# Patient Record
Sex: MALE | Race: WHITE | NOT HISPANIC OR LATINO | Employment: UNEMPLOYED | URBAN - METROPOLITAN AREA
[De-identification: names, ages, dates, MRNs, and addresses within clinical notes are randomized per-mention and may not be internally consistent; named-entity substitution may affect disease eponyms.]

---

## 2019-01-21 ENCOUNTER — HOSPITAL ENCOUNTER (EMERGENCY)
Facility: HOSPITAL | Age: 34
Discharge: HOME | End: 2019-01-21
Attending: EMERGENCY MEDICINE
Payer: MEDICAID

## 2019-01-21 VITALS
OXYGEN SATURATION: 98 % | BODY MASS INDEX: 30.55 KG/M2 | DIASTOLIC BLOOD PRESSURE: 102 MMHG | TEMPERATURE: 99 F | HEART RATE: 114 BPM | RESPIRATION RATE: 18 BRPM | SYSTOLIC BLOOD PRESSURE: 163 MMHG | WEIGHT: 264 LBS | HEIGHT: 78 IN

## 2019-01-21 DIAGNOSIS — M10.9 ACUTE GOUT OF LEFT FOOT, UNSPECIFIED CAUSE: Primary | ICD-10-CM

## 2019-01-21 DIAGNOSIS — M10.9 ACUTE GOUT OF RIGHT FOOT, UNSPECIFIED CAUSE: ICD-10-CM

## 2019-01-21 PROCEDURE — 25000003 PHARM REV CODE 250: Mod: ER | Performed by: EMERGENCY MEDICINE

## 2019-01-21 PROCEDURE — 99283 EMERGENCY DEPT VISIT LOW MDM: CPT | Mod: ER

## 2019-01-21 RX ORDER — COLCHICINE 0.6 MG/1
0.6 TABLET ORAL DAILY
Qty: 30 TABLET | Refills: 11 | Status: SHIPPED | OUTPATIENT
Start: 2019-01-21 | End: 2019-11-28 | Stop reason: CLARIF

## 2019-01-21 RX ORDER — INDOMETHACIN 25 MG/1
50 CAPSULE ORAL
Qty: 30 CAPSULE | Refills: 0 | Status: SHIPPED | OUTPATIENT
Start: 2019-01-21 | End: 2019-11-28 | Stop reason: CLARIF

## 2019-01-21 RX ORDER — COLCHICINE 0.6 MG/1
0.6 TABLET, FILM COATED ORAL
Status: COMPLETED | OUTPATIENT
Start: 2019-01-21 | End: 2019-01-21

## 2019-01-21 RX ADMIN — COLCHICINE 0.6 MG: 0.6 TABLET, FILM COATED ORAL at 08:01

## 2019-01-21 NOTE — ED PROVIDER NOTES
Encounter Date: 1/21/2019    SCRIBE #1 NOTE: I, Yessica Hinkle, am scribing for, and in the presence of,  Dr. So. I have scribed the following portions of the note - Other sections scribed: HPI,ROS,PE .       History     Chief Complaint   Patient presents with    Gout     lulú. feet are hurting  think's his gout is acting up,  he denies injuries  started  Friday.  worse this morning.      32 y/o/m with a hx of gout presents with bilateral foot pain for 3 days. Pain worsened this morning The patient thinks this pain is his gout. He states normally it is always his left foot. He states he did drink ETOH this past weekend. Last gout flare up was 1 month a half ago. Denies fever, weakness or numbness.  Patient has not taken anything for the pain.  Usually takes colchicine with improvement      The history is provided by the patient. No  was used.     Review of patient's allergies indicates:  Allergies not on file  Past Medical History:   Diagnosis Date    Gout      History reviewed. No pertinent surgical history.  History reviewed. No pertinent family history.  Social History     Tobacco Use    Smoking status: Former Smoker    Tobacco comment: has not smoked in  8 years.    Substance Use Topics    Alcohol use: Yes     Comment: weekends     Drug use: Yes     Comment: a long time ago.   not now.      Review of Systems   Constitutional: Negative for fever.   Musculoskeletal: Negative for gait problem.        Bilateral foot pain.   Skin: Negative for color change.   Neurological: Negative for weakness and numbness.       Physical Exam     Initial Vitals [01/21/19 0819]   BP Pulse Resp Temp SpO2   (!) 168/101 108 18 98.8 °F (37.1 °C) 100 %      MAP       --         Physical Exam    Nursing note and vitals reviewed.  Constitutional: He appears well-developed and well-nourished.   HENT:   Head: Normocephalic and atraumatic.   Eyes: Conjunctivae are normal.   Neck: Normal range of motion. Neck  supple.   Cardiovascular: Normal rate, intact distal pulses and normal pulses.   Pulmonary/Chest: No respiratory distress.   Musculoskeletal: Normal range of motion.        Right foot: There is tenderness.        Left foot: There is tenderness.   Minimal tenderness, lateral aspect of left foot. Right foot minimal tenderness , slightly with erythema. No warmth. Good pulse and sensation bilaterally.     Neurological: He is alert and oriented to person, place, and time. No sensory deficit.   Skin: Skin is warm and dry.   Psychiatric: He has a normal mood and affect. His behavior is normal.         ED Course   Procedures  Labs Reviewed - No data to display       Imaging Results    None          Medical Decision Making:   Initial Assessment:   34 y/o/m presents with bilateral foot pain for 3 days. Hx of gout. Exam significant for Minimal tenderness, lateral aspect of left foot. Right foot minimal tenderness , slightly with erythema. No warmth. Good pulse and sensation bilaterally.   ED Management:  Ordering colchicine.  Patient will be discharged on colchicine and indomethacin            Scribe Attestation:   Scribe #1: I performed the above scribed service and the documentation accurately describes the services I performed. I attest to the accuracy of the note.       I, Dr. Yuliet So, personally performed the services described in this documentation. All medical record entries made by the scribe were at my direction and in my presence.  I have reviewed the chart and agree that the record reflects my personal performance and is accurate and complete. Yuliet So MD.  12:31 PM 01/21/2019             Clinical Impression:     1. Acute gout of left foot, unspecified cause    2. Acute gout of right foot, unspecified cause                                   Yuliet So MD  01/21/19 4545

## 2019-04-30 ENCOUNTER — HOSPITAL ENCOUNTER (EMERGENCY)
Facility: HOSPITAL | Age: 34
Discharge: HOME | End: 2019-04-30
Attending: EMERGENCY MEDICINE
Payer: MEDICAID

## 2019-04-30 DIAGNOSIS — R06.02 SHORTNESS OF BREATH: ICD-10-CM

## 2019-04-30 DIAGNOSIS — R50.9 FEVER: ICD-10-CM

## 2019-04-30 DIAGNOSIS — B34.9 VIRAL SYNDROME: Primary | ICD-10-CM

## 2019-04-30 LAB
ALBUMIN SERPL-MCNC: 3.7 G/DL
ALP SERPL-CCNC: 63 U/L
BASOPHILS # BLD AUTO: 0.01 K/UL (ref 0–0.2)
BASOPHILS NFR BLD: 0.1 % (ref 0–1.9)
BILIRUB SERPL-MCNC: 0.6 MG/DL
BILIRUBIN, POC UA: NEGATIVE
BLOOD, POC UA: NEGATIVE
BUN SERPL-MCNC: 18 MG/DL
CALCIUM SERPL-MCNC: 9.4 MG/DL
CHLORIDE SERPL-SCNC: 106 MMOL/L
CLARITY, POC UA: CLEAR
COLOR, POC UA: YELLOW
CREAT SERPL-MCNC: 1.3 MG/DL
CTP QC/QA: YES
DIFFERENTIAL METHOD: ABNORMAL
EOSINOPHIL # BLD AUTO: 0.1 K/UL (ref 0–0.5)
EOSINOPHIL NFR BLD: 0.4 % (ref 0–8)
ERYTHROCYTE [DISTWIDTH] IN BLOOD BY AUTOMATED COUNT: 12.9 % (ref 11.5–14.5)
FLUAV AG NPH QL: NEGATIVE
FLUBV AG NPH QL: NEGATIVE
GLUCOSE SERPL-MCNC: 127 MG/DL (ref 70–110)
GLUCOSE, POC UA: NEGATIVE
HCT VFR BLD AUTO: 45.6 % (ref 40–54)
HGB BLD-MCNC: 15.2 G/DL (ref 14–18)
KETONES, POC UA: NEGATIVE
LEUKOCYTE EST, POC UA: ABNORMAL
LYMPHOCYTES # BLD AUTO: 0.5 K/UL (ref 1–4.8)
LYMPHOCYTES NFR BLD: 3.4 % (ref 18–48)
MCH RBC QN AUTO: 30 PG (ref 27–31)
MCHC RBC AUTO-ENTMCNC: 33.3 G/DL (ref 32–36)
MCV RBC AUTO: 90 FL (ref 82–98)
MONOCYTES # BLD AUTO: 1.3 K/UL (ref 0.3–1)
MONOCYTES NFR BLD: 9 % (ref 4–15)
NEUTROPHILS # BLD AUTO: 12.3 K/UL (ref 1.8–7.7)
NEUTROPHILS NFR BLD: 87.1 % (ref 38–73)
NITRITE, POC UA: NEGATIVE
PH UR STRIP: 5.5 [PH]
PLATELET # BLD AUTO: 180 K/UL (ref 150–350)
PMV BLD AUTO: 11.8 FL (ref 9.2–12.9)
POC ALT (SGPT): 105 U/L
POC AST (SGOT): 57 U/L
POC CARDIAC TROPONIN I: 0 NG/ML
POC D-DI: <100 NG/ML (ref 0–450)
POC TCO2: 23 MMOL/L
POTASSIUM BLD-SCNC: 4.4 MMOL/L
PROTEIN, POC UA: NEGATIVE
PROTEIN, POC: 7.3 G/DL
RBC # BLD AUTO: 5.07 M/UL (ref 4.6–6.2)
SAMPLE: NORMAL
SODIUM BLD-SCNC: 142 MMOL/L
SPECIFIC GRAVITY, POC UA: 1.02
UROBILINOGEN, POC UA: 0.2 E.U./DL
WBC # BLD AUTO: 14.06 K/UL (ref 3.9–12.7)

## 2019-04-30 PROCEDURE — 25000003 PHARM REV CODE 250: Mod: ER | Performed by: EMERGENCY MEDICINE

## 2019-04-30 PROCEDURE — 85025 COMPLETE CBC W/AUTO DIFF WBC: CPT

## 2019-04-30 PROCEDURE — 87086 URINE CULTURE/COLONY COUNT: CPT

## 2019-04-30 PROCEDURE — 93005 ELECTROCARDIOGRAM TRACING: CPT | Mod: ER

## 2019-04-30 PROCEDURE — 81003 URINALYSIS AUTO W/O SCOPE: CPT | Mod: ER

## 2019-04-30 PROCEDURE — 96365 THER/PROPH/DIAG IV INF INIT: CPT | Mod: ER

## 2019-04-30 PROCEDURE — 93010 EKG 12-LEAD: ICD-10-PCS | Mod: ,,, | Performed by: INTERNAL MEDICINE

## 2019-04-30 PROCEDURE — 87804 INFLUENZA ASSAY W/OPTIC: CPT | Mod: ER

## 2019-04-30 PROCEDURE — 85379 FIBRIN DEGRADATION QUANT: CPT | Mod: ER

## 2019-04-30 PROCEDURE — 84484 ASSAY OF TROPONIN QUANT: CPT | Mod: ER

## 2019-04-30 PROCEDURE — 63600175 PHARM REV CODE 636 W HCPCS: Mod: ER | Performed by: EMERGENCY MEDICINE

## 2019-04-30 PROCEDURE — 96361 HYDRATE IV INFUSION ADD-ON: CPT | Mod: ER

## 2019-04-30 PROCEDURE — 85025 COMPLETE CBC W/AUTO DIFF WBC: CPT | Mod: ER

## 2019-04-30 PROCEDURE — 80053 COMPREHEN METABOLIC PANEL: CPT | Mod: ER

## 2019-04-30 PROCEDURE — 99285 EMERGENCY DEPT VISIT HI MDM: CPT | Mod: 25,ER

## 2019-04-30 PROCEDURE — 96375 TX/PRO/DX INJ NEW DRUG ADDON: CPT | Mod: ER

## 2019-04-30 PROCEDURE — 93010 ELECTROCARDIOGRAM REPORT: CPT | Mod: ,,, | Performed by: INTERNAL MEDICINE

## 2019-04-30 RX ORDER — IBUPROFEN 400 MG/1
800 TABLET ORAL
Status: DISCONTINUED | OUTPATIENT
Start: 2019-04-30 | End: 2019-04-30

## 2019-04-30 RX ORDER — ACETAMINOPHEN 500 MG
1000 TABLET ORAL
Status: COMPLETED | OUTPATIENT
Start: 2019-04-30 | End: 2019-04-30

## 2019-04-30 RX ORDER — KETOROLAC TROMETHAMINE 30 MG/ML
30 INJECTION, SOLUTION INTRAMUSCULAR; INTRAVENOUS
Status: COMPLETED | OUTPATIENT
Start: 2019-04-30 | End: 2019-04-30

## 2019-04-30 RX ORDER — IBUPROFEN 400 MG/1
800 TABLET ORAL
Status: COMPLETED | OUTPATIENT
Start: 2019-04-30 | End: 2019-04-30

## 2019-04-30 RX ADMIN — IBUPROFEN 800 MG: 400 TABLET, FILM COATED ORAL at 06:04

## 2019-04-30 RX ADMIN — ACETAMINOPHEN 1000 MG: 500 TABLET, FILM COATED ORAL at 04:04

## 2019-04-30 RX ADMIN — SODIUM CHLORIDE 1000 ML: 0.9 INJECTION, SOLUTION INTRAVENOUS at 07:04

## 2019-04-30 RX ADMIN — SODIUM CHLORIDE 1000 ML: 0.9 INJECTION, SOLUTION INTRAVENOUS at 06:04

## 2019-04-30 RX ADMIN — KETOROLAC TROMETHAMINE 30 MG: 30 INJECTION, SOLUTION INTRAMUSCULAR at 04:04

## 2019-04-30 RX ADMIN — SODIUM CHLORIDE 1000 ML: 0.9 INJECTION, SOLUTION INTRAVENOUS at 04:04

## 2019-04-30 RX ADMIN — SODIUM CHLORIDE 1000 ML: 0.9 INJECTION, SOLUTION INTRAVENOUS at 05:04

## 2019-04-30 RX ADMIN — CEFTRIAXONE 1 G: 1 INJECTION, SOLUTION INTRAVENOUS at 06:04

## 2019-04-30 NOTE — ED PROVIDER NOTES
"Encounter Date: 4/30/2019    SCRIBE #1 NOTE: I, Yessica Hinkle, am scribing for, and in the presence of,  Dr. So . I have scribed the following portions of the note - Other sections scribed: HPI, ROS,PE .       History     Chief Complaint   Patient presents with    Generalized Body Aches     pt reports he went to [employer] for lunch at noon and when he returned to work he started feeling bad.  headache, achy, pain that shoots down left leg. "and it feels like its hard to catch my breath when i'm talking"    Chills    Headache    Shortness of Breath     35 y/o/m presents with dizziness, headache, lightheadedness, cramping to left leg, nausea, SOB with speaking, diarrhea, felt hot, chills, blurry vision after eating at [employer] today at 12 pm. He states he does not drink enough water.  Denies vomiting, cough, hematuria, dysuria or neck pain. Patient's symptoms began suddenly after lunch.  He denies recent illness    The history is provided by the patient. No  was used.     Review of patient's allergies indicates:  No Known Allergies  Past Medical History:   Diagnosis Date    Gout      History reviewed. No pertinent surgical history.  History reviewed. No pertinent family history.  Social History     Tobacco Use    Smoking status: Former Smoker    Tobacco comment: has not smoked in  8 years.    Substance Use Topics    Alcohol use: Yes     Comment: weekends     Drug use: Yes     Comment: a long time ago.   not now.      Review of Systems   Constitutional: Positive for chills.   Eyes: Positive for visual disturbance.   Respiratory: Positive for shortness of breath (with speaking). Negative for cough.    Gastrointestinal: Positive for diarrhea and nausea. Negative for vomiting.   Genitourinary: Negative for difficulty urinating, dysuria and hematuria.   Musculoskeletal: Positive for myalgias (cramping to left leg). Negative for neck pain.   Neurological: Positive for dizziness, " light-headedness and headaches.   All other systems reviewed and are negative.      Physical Exam     Initial Vitals [04/30/19 1634]   BP Pulse Resp Temp SpO2   (!) 146/81 (!) 127 20 (!) 101.9 °F (38.8 °C) 97 %      MAP       --         Physical Exam    Nursing note and vitals reviewed.  Constitutional: He appears well-developed and well-nourished.   HENT:   Head: Normocephalic and atraumatic.   Mouth/Throat: Oropharynx is clear and moist. No oropharyngeal exudate, posterior oropharyngeal edema or posterior oropharyngeal erythema.   Eyes: Conjunctivae are normal.   Neck: Normal range of motion. Neck supple. No neck rigidity.   No nuclear rigidity.    Cardiovascular: Normal rate, regular rhythm, normal heart sounds and intact distal pulses. Exam reveals no gallop and no friction rub.    No murmur heard.  Pulmonary/Chest: Breath sounds normal. No respiratory distress. He has no wheezes. He has no rhonchi. He has no rales.   Abdominal: There is no tenderness. There is no rigidity, no rebound and no guarding.   Musculoskeletal: Normal range of motion.   Neurological: He is alert and oriented to person, place, and time. No cranial nerve deficit or sensory deficit. GCS eye subscore is 4. GCS verbal subscore is 5. GCS motor subscore is 6.   No photophobia.    Skin: Skin is warm and dry.   Psychiatric: He has a normal mood and affect. His behavior is normal.         ED Course   Critical Care  Date/Time: 4/30/2019 6:37 PM  Performed by: Yuliet So MD  Authorized by: Yuliet So MD   Direct patient critical care time: 30 minutes  Ordering / reviewing critical care time: 5 minutes  Documentation critical care time: 5 minutes  Total critical care time (exclusive of procedural time) : 40 minutes  Critical care was necessary to treat or prevent imminent or life-threatening deterioration of the following conditions: dehydration.  Critical care was time spent personally by me on the following activities: development  of treatment plan with patient or surrogate, obtaining history from patient or surrogate, ordering and review of radiographic studies, re-evaluation of patient's condition, ordering and review of laboratory studies, examination of patient, evaluation of patient's response to treatment and ordering and performing treatments and interventions.        Labs Reviewed   CBC W/ AUTO DIFFERENTIAL - Abnormal; Notable for the following components:       Result Value    WBC 14.06 (*)     Gran # (ANC) 12.3 (*)     Lymph # 0.5 (*)     Mono # 1.3 (*)     Gran% 87.1 (*)     Lymph% 3.4 (*)     All other components within normal limits   POCT URINALYSIS W/O SCOPE - Abnormal; Notable for the following components:    Glucose, UA Negative (*)     Bilirubin, UA Negative (*)     Ketones, UA Negative (*)     Blood, UA Negative (*)     Protein, UA Negative (*)     Nitrite, UA Negative (*)     Leukocytes, UA Trace (*)     All other components within normal limits   POCT D DIMER - Abnormal; Notable for the following components:    POC D-DI <100 (*)     All other components within normal limits   CULTURE, URINE   TROPONIN ISTAT   POCT URINALYSIS W/O SCOPE   POCT INFLUENZA A/B   POCT CBC   POCT CMP   POCT CMP   POCT D DIMER   POCT TROPONIN     EKG Readings: (Independently Interpreted)   Sinus tachycardia, heart rate 142, no ST segment elevation, normal QT, normal axis       Imaging Results          X-Ray Chest PA And Lateral (Final result)  Result time 04/30/19 17:09:57    Final result by Bobby Lozano MD (04/30/19 17:09:57)                 Impression:      1. Mildly prominent interstitial attenuation, likely accentuated by shallow inspiratory effort, no convincing large focal consolidation.      Electronically signed by: Bobby Lozano MD  Date:    04/30/2019  Time:    17:09             Narrative:    EXAMINATION:  XR CHEST PA AND LATERAL    CLINICAL HISTORY:  Fever, unspecified    TECHNIQUE:  PA and lateral views of the chest were  performed.    COMPARISON:  None    FINDINGS:  The cardiomediastinal silhouette is not enlarged.  There is no pleural effusion.  The trachea is midline.  The lungs are symmetrically expanded bilaterally with coarse interstitial attenuation bilaterally.  There is elevation of the right hemidiaphragm..  No large focal consolidation seen.  There is no pneumothorax.  The osseous structures are remarkable for degenerative changes..                                 Medical Decision Making:   Initial Assessment:   33 y/o/m presents with lightheadedness, chills, SOB, diarrhea and nausea today after eating at [employer]. Exam benign. No nuchal  rigidity.  Patient is tachycardic.  He is nontoxic appearing  Independently Interpreted Test(s):   I have ordered and independently interpreted EKG Reading(s) - see prior notes  Clinical Tests:   Lab Tests: Ordered and Reviewed  Radiological Study: Ordered and Reviewed  Medical Tests: Ordered and Reviewed  ED Management:  Treating with IV fluids, toradol and tylenol. CXR, flu swab, and EKG ordered.  Patient's workup is significant for a white blood cell count of 14.8, he has trace leukocytes %period% creatinine is 1.3, glucose 127.  Patient has no nuchal rigidity or photophobia.  I do not suspect meningitis.  Chest x-ray shows no acute abnormalities.  Flu was negative. Patient be given IV fluids, Tylenol and ibuprofen and reassessed.  Patient does admit to drinking energy drinks.  Patient was given several L of fluids.  Care was turned over to Dr. Hills at 7:00 p.m. because patient's heart rate was still elevated.  Please see discharge instructions.            Scribe Attestation:   Scribe #1: I performed the above scribed service and the documentation accurately describes the services I performed. I attest to the accuracy of the note.       I, Dr. Yuliet So, personally performed the services described in this documentation. All medical record entries made by the scribe were at my  direction and in my presence.  I have reviewed the chart and agree that the record reflects my personal performance and is accurate and complete. Yuliet So MD.  6:24 PM 05/01/2019             Clinical Impression:     1. Viral syndrome    2. Shortness of breath    3. Fever                                   Yuliet So MD  05/01/19 0741

## 2019-05-01 VITALS
TEMPERATURE: 99 F | HEIGHT: 69 IN | HEART RATE: 122 BPM | SYSTOLIC BLOOD PRESSURE: 138 MMHG | WEIGHT: 260 LBS | OXYGEN SATURATION: 96 % | RESPIRATION RATE: 29 BRPM | BODY MASS INDEX: 38.51 KG/M2 | DIASTOLIC BLOOD PRESSURE: 77 MMHG

## 2019-05-02 ENCOUNTER — HOSPITAL ENCOUNTER (EMERGENCY)
Facility: HOSPITAL | Age: 34
Discharge: HOME | End: 2019-05-02
Attending: EMERGENCY MEDICINE
Payer: MEDICAID

## 2019-05-02 VITALS
OXYGEN SATURATION: 99 % | HEART RATE: 112 BPM | WEIGHT: 260 LBS | TEMPERATURE: 99 F | SYSTOLIC BLOOD PRESSURE: 154 MMHG | DIASTOLIC BLOOD PRESSURE: 98 MMHG | BODY MASS INDEX: 38.51 KG/M2 | RESPIRATION RATE: 22 BRPM | HEIGHT: 69 IN

## 2019-05-02 DIAGNOSIS — I10 ELEVATED BLOOD PRESSURE READING WITH DIAGNOSIS OF HYPERTENSION: ICD-10-CM

## 2019-05-02 DIAGNOSIS — L03.116 LEFT LEG CELLULITIS: Primary | ICD-10-CM

## 2019-05-02 DIAGNOSIS — R00.0 TACHYCARDIA: ICD-10-CM

## 2019-05-02 DIAGNOSIS — M79.89 LEFT LEG SWELLING: ICD-10-CM

## 2019-05-02 LAB
ALBUMIN SERPL-MCNC: 3.2 G/DL
ALP SERPL-CCNC: 46 U/L
BACTERIA UR CULT: NO GROWTH
BILIRUB SERPL-MCNC: 0.5 MG/DL
BUN SERPL-MCNC: 13 MG/DL
CALCIUM SERPL-MCNC: 8.9 MG/DL
CHLORIDE SERPL-SCNC: 111 MMOL/L
CREAT SERPL-MCNC: 1.2 MG/DL
GLUCOSE SERPL-MCNC: 183 MG/DL (ref 70–110)
POC ALT (SGPT): 71 U/L
POC AST (SGOT): 50 U/L
POC TCO2: 24 MMOL/L
POTASSIUM BLD-SCNC: 3.9 MMOL/L
PROTEIN, POC: 7 G/DL
SODIUM BLD-SCNC: 147 MMOL/L

## 2019-05-02 PROCEDURE — 25000003 PHARM REV CODE 250: Mod: ER | Performed by: NURSE PRACTITIONER

## 2019-05-02 PROCEDURE — 85025 COMPLETE CBC W/AUTO DIFF WBC: CPT | Mod: ER

## 2019-05-02 PROCEDURE — S0077 INJECTION, CLINDAMYCIN PHOSP: HCPCS | Mod: ER | Performed by: NURSE PRACTITIONER

## 2019-05-02 PROCEDURE — 93010 ELECTROCARDIOGRAM REPORT: CPT | Mod: ,,, | Performed by: INTERNAL MEDICINE

## 2019-05-02 PROCEDURE — 80053 COMPREHEN METABOLIC PANEL: CPT | Mod: ER

## 2019-05-02 PROCEDURE — 93005 ELECTROCARDIOGRAM TRACING: CPT | Mod: ER

## 2019-05-02 PROCEDURE — 96361 HYDRATE IV INFUSION ADD-ON: CPT | Mod: ER

## 2019-05-02 PROCEDURE — 93010 EKG 12-LEAD: ICD-10-PCS | Mod: ,,, | Performed by: INTERNAL MEDICINE

## 2019-05-02 PROCEDURE — 99284 EMERGENCY DEPT VISIT MOD MDM: CPT | Mod: ER

## 2019-05-02 PROCEDURE — 96365 THER/PROPH/DIAG IV INF INIT: CPT | Mod: ER

## 2019-05-02 RX ORDER — METOPROLOL SUCCINATE 25 MG/1
25 TABLET, EXTENDED RELEASE ORAL DAILY
Qty: 30 TABLET | Refills: 0 | Status: SHIPPED | OUTPATIENT
Start: 2019-05-02 | End: 2020-10-29 | Stop reason: SDUPTHER

## 2019-05-02 RX ORDER — CLONIDINE HYDROCHLORIDE 0.1 MG/1
0.1 TABLET ORAL
Status: COMPLETED | OUTPATIENT
Start: 2019-05-02 | End: 2019-05-02

## 2019-05-02 RX ORDER — CLINDAMYCIN HYDROCHLORIDE 300 MG/1
300 CAPSULE ORAL EVERY 8 HOURS
Qty: 21 CAPSULE | Refills: 0 | Status: SHIPPED | OUTPATIENT
Start: 2019-05-02 | End: 2019-05-09

## 2019-05-02 RX ORDER — CLINDAMYCIN PHOSPHATE 600 MG/50ML
600 INJECTION, SOLUTION INTRAVENOUS
Status: COMPLETED | OUTPATIENT
Start: 2019-05-02 | End: 2019-05-02

## 2019-05-02 RX ADMIN — SODIUM CHLORIDE 1000 ML: 0.9 INJECTION, SOLUTION INTRAVENOUS at 07:05

## 2019-05-02 RX ADMIN — CLONIDINE HYDROCHLORIDE 0.1 MG: 0.1 TABLET ORAL at 07:05

## 2019-05-02 RX ADMIN — SODIUM CHLORIDE 1000 ML: 0.9 INJECTION, SOLUTION INTRAVENOUS at 06:05

## 2019-05-02 RX ADMIN — CLINDAMYCIN IN 5 PERCENT DEXTROSE 600 MG: 12 INJECTION, SOLUTION INTRAVENOUS at 05:05

## 2019-05-02 NOTE — ED PROVIDER NOTES
Encounter Date: 5/2/2019    SCRIBE #1 NOTE: I, Alondra Ordonez, am scribing for, and in the presence of,  Toussaint Battley, FNP. I have scribed the following portions of the note - Other sections scribed: HPI, ROS, PE.       History     Chief Complaint   Patient presents with    Cellulitis     pt c/o left leg skin redness and pain. states that he noticed it yesterday and now getting worse. pt states that he is a  by trade and wears shorts to the job.      Juan Johnson is a 34 y.o. male who presents to the ED complaining of patchy redness to left lower leg since yesterday. The redness extends above his knee. He reports pain and swelling to area.  He is a . Denies injury.    He states he visited the ED 4/20/2019 for dehydration and was given IV fluids. He states he was worked up for a blood clot and results were negative.      The history is provided by the patient. No  was used.     Review of patient's allergies indicates:  No Known Allergies  Past Medical History:   Diagnosis Date    Gout      History reviewed. No pertinent surgical history.  History reviewed. No pertinent family history.  Social History     Tobacco Use    Smoking status: Former Smoker    Tobacco comment: has not smoked in  8 years.    Substance Use Topics    Alcohol use: Yes     Comment: weekends     Drug use: Yes     Comment: a long time ago.   not now.      Review of Systems   Constitutional: Negative.  Negative for fever.   HENT: Negative.  Negative for sore throat.    Eyes: Negative.  Negative for pain.   Respiratory: Negative.  Negative for shortness of breath.    Cardiovascular: Negative.  Negative for chest pain.   Gastrointestinal: Negative.  Negative for abdominal pain and vomiting.   Genitourinary: Negative.  Negative for dysuria.   Musculoskeletal: Negative.    Skin: Positive for color change. Negative for rash.   Neurological: Negative.  Negative for headaches.   Hematological: Negative.     Psychiatric/Behavioral: Negative.    All other systems reviewed and are negative.      Physical Exam     Initial Vitals [05/02/19 1559]   BP Pulse Resp Temp SpO2   (!) 153/109 (!) 111 18 98.6 °F (37 °C) 98 %      MAP       --         Physical Exam    Nursing note and vitals reviewed.  Constitutional: He appears well-developed and well-nourished.   HENT:   Head: Normocephalic and atraumatic.   Right Ear: External ear normal.   Left Ear: External ear normal.   Nose: Nose normal.   Mouth/Throat: Oropharynx is clear and moist.   Eyes: Conjunctivae and EOM are normal. Pupils are equal, round, and reactive to light.   Neck: Normal range of motion. Neck supple.   Cardiovascular: Normal rate, regular rhythm, normal heart sounds and intact distal pulses. Exam reveals no gallop and no friction rub.    No murmur heard.  Pulmonary/Chest: Breath sounds normal. No stridor. No respiratory distress. He has no wheezes. He has no rhonchi. He has no rales. He exhibits no tenderness.   Abdominal: Soft. Bowel sounds are normal. He exhibits no distension and no mass. There is no tenderness. There is no rebound and no guarding.   Musculoskeletal: Normal range of motion.   Swelling noted to left lower extremity with patchy erythema extending to thigh.   Neurological: He is alert and oriented to person, place, and time. No cranial nerve deficit or sensory deficit. GCS score is 15. GCS eye subscore is 4. GCS verbal subscore is 5. GCS motor subscore is 6.   Skin: Skin is warm and dry. Capillary refill takes less than 2 seconds. There is erythema.   Psychiatric: He has a normal mood and affect. His behavior is normal.         ED Course   Procedures  Labs Reviewed   POCT CBC   POCT CMP   POCT CMP     EKG Readings: (Independently Interpreted)   Initial Reading: No STEMI. Rhythm: Sinus Tachycardia. Heart Rate: 112. Ectopy: No Ectopy. Conduction: Normal. ST Segments: Normal ST Segments. T Waves: Normal. Clinical Impression: Sinus Tachycardia        Imaging Results          US Lower Extremity Veins Left (Final result)  Result time 05/02/19 17:38:49    Final result by Jluis Morua MD (05/02/19 17:38:49)                 Impression:      No evidence of deep venous thrombosis in the left lower extremity.    Left inguinal adenopathy.      Electronically signed by: Jluis Moura MD  Date:    05/02/2019  Time:    17:38             Narrative:    EXAMINATION:  US LOWER EXTREMITY VEINS LEFT    CLINICAL HISTORY:  Other specified soft tissue disorders    TECHNIQUE:  Duplex and color flow Doppler evaluation and graded compression of the left lower extremity veins was performed.    COMPARISON:  None    FINDINGS:  Left thigh veins: The common femoral, femoral, popliteal, upper greater saphenous, and proximal deep femoral veins are patent and free of thrombus. The veins are normally compressible and have normal phasic flow and augmentation response.    Left calf veins: The visualized calf veins are patent.    Contralateral CFV: The contralateral (right) common femoral vein is patent and free of thrombus.    Miscellaneous: There are enlarged lymph nodes identified in the left inguinal region, 1 measuring 1.5 cm in short axis and another measuring 1.6 cm in short axis.                                 Medical Decision Making:   Initial Assessment:   Left leg cellulitis  Differential Diagnosis:   DVT, leukocytosis  Clinical Tests:   Lab Tests: Ordered and Reviewed  The following lab test(s) were unremarkable: CBC and CMP  Radiological Study: Reviewed and Ordered  Medical Tests: Ordered and Reviewed  ED Management:  Clonidine p.o. and 2 L normal saline bolus given in the ER.  Repeat pulse is 110 and repeat blood pressure is 154/98.  Patient remains asymptomatic in terms of blood pressure.  Also denies any chest pain or shortness of breath.  1 L normal saline bolus IV given in the ER.  Clindamycin IV given in the ER.  Patient will be discharged home on metoprolol for  tachycardia and hypertension, as well Clindamycin and align probiotic for cellulitis.  Patient is instructed to follow up with Cardiology and primary care provider tomorrow return to the ER as needed if symptoms worsen or fail to improve, as well as drink 6-8 glasses of water daily.  Patient verbalized understanding of discharge instruction treatment plan            Scribe Attestation:   Scribe #1: I performed the above scribed service and the documentation accurately describes the services I performed. I attest to the accuracy of the note.               Clinical Impression:     1. Left leg cellulitis    2. Left leg swelling    3. Elevated blood pressure reading with diagnosis of hypertension    4. Tachycardia                                   Toussaint Battley III, FNP  05/02/19 2012

## 2019-05-03 ENCOUNTER — HOSPITAL ENCOUNTER (EMERGENCY)
Facility: HOSPITAL | Age: 34
Discharge: HOME | End: 2019-05-03
Attending: INTERNAL MEDICINE
Payer: MEDICAID

## 2019-05-03 VITALS
DIASTOLIC BLOOD PRESSURE: 96 MMHG | HEART RATE: 101 BPM | TEMPERATURE: 99 F | SYSTOLIC BLOOD PRESSURE: 148 MMHG | WEIGHT: 260 LBS | HEIGHT: 69 IN | OXYGEN SATURATION: 99 % | BODY MASS INDEX: 38.51 KG/M2 | RESPIRATION RATE: 19 BRPM

## 2019-05-03 DIAGNOSIS — J98.4 PNEUMONITIS: Primary | ICD-10-CM

## 2019-05-03 LAB
ALBUMIN SERPL-MCNC: 3.1 G/DL
ALP SERPL-CCNC: 50 U/L
BILIRUB SERPL-MCNC: 0.7 MG/DL
BUN SERPL-MCNC: 12 MG/DL
CALCIUM SERPL-MCNC: 8.7 MG/DL
CHLORIDE SERPL-SCNC: 108 MMOL/L
CREAT SERPL-MCNC: 1 MG/DL
GLUCOSE SERPL-MCNC: 118 MG/DL (ref 70–110)
POC ALT (SGPT): 75 U/L
POC AST (SGOT): 56 U/L
POC B-TYPE NATRIURETIC PEPTIDE: 83 PG/ML
POC CARDIAC TROPONIN I: 0.03 NG/ML
POC TCO2: 26 MMOL/L
POTASSIUM BLD-SCNC: 4.1 MMOL/L
PROTEIN, POC: 7 G/DL
SAMPLE: NORMAL
SODIUM BLD-SCNC: 138 MMOL/L

## 2019-05-03 PROCEDURE — 96374 THER/PROPH/DIAG INJ IV PUSH: CPT | Mod: ER,59

## 2019-05-03 PROCEDURE — 25500020 PHARM REV CODE 255: Mod: ER | Performed by: INTERNAL MEDICINE

## 2019-05-03 PROCEDURE — 84484 ASSAY OF TROPONIN QUANT: CPT | Mod: ER

## 2019-05-03 PROCEDURE — 85025 COMPLETE CBC W/AUTO DIFF WBC: CPT | Mod: ER

## 2019-05-03 PROCEDURE — 63600175 PHARM REV CODE 636 W HCPCS: Mod: ER | Performed by: INTERNAL MEDICINE

## 2019-05-03 PROCEDURE — 99285 EMERGENCY DEPT VISIT HI MDM: CPT | Mod: 25,ER

## 2019-05-03 PROCEDURE — 80053 COMPREHEN METABOLIC PANEL: CPT | Mod: ER

## 2019-05-03 PROCEDURE — 83880 ASSAY OF NATRIURETIC PEPTIDE: CPT | Mod: ER

## 2019-05-03 RX ORDER — AZITHROMYCIN 250 MG/1
TABLET, FILM COATED ORAL
Qty: 6 TABLET | Refills: 0 | Status: SHIPPED | OUTPATIENT
Start: 2019-05-03 | End: 2019-11-28 | Stop reason: CLARIF

## 2019-05-03 RX ORDER — CEFTRIAXONE 1 G/1
1 INJECTION, POWDER, FOR SOLUTION INTRAMUSCULAR; INTRAVENOUS
Status: COMPLETED | OUTPATIENT
Start: 2019-05-03 | End: 2019-05-03

## 2019-05-03 RX ADMIN — CEFTRIAXONE SODIUM 1 G: 1 INJECTION, POWDER, FOR SOLUTION INTRAMUSCULAR; INTRAVENOUS at 09:05

## 2019-05-03 RX ADMIN — IOHEXOL 100 ML: 350 INJECTION, SOLUTION INTRAVENOUS at 08:05

## 2019-05-03 NOTE — ED NOTES
ASSUMED CARE OF PT, PT AND WIFE UPDATED ON PLAN OF CARE AND BOTH VERBALIZE UNDERSTANDING, DENIES ANY NEEDS OR CONCERNS AT PRESENT TIME

## 2019-05-03 NOTE — ED NOTES
Rounding on the patient has been done.   he has been updated on the plan of care and his current status.  Necessary items were placed with in his reach and he was advised when a reassessment would take place.   The call bell remains at the bedside for any additional patient needs.   The patient is resting comfortably on the stretcher  Airway, Breathing, Circulation intact.   Will continue to monitor.

## 2019-05-04 NOTE — ED PROVIDER NOTES
Encounter Date: 5/3/2019    SCRIBE #1 NOTE: I, Scott Sanches, am scribing for, and in the presence of,  Dr. London. I have scribed the following portions of the note - Other sections scribed: HPI, ROS, PE.       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 PT REPORTS SOB AT REST BUT WORSE WHEN WALKING      CC:   SOB  HPI:  This is a 34 y.o. male who presents to the ED with a chief complaint of acute constant SOB that began last night.  Pt was seen in this ED yesterday for cellulitis and was rx' d clindamycin and lopressor and notes that he began feeling SOB shortly after taking this medication.  Exertion worsens his SOB.  Of notes, Pt was tachycardic and had a high blood pressure in the ED yesterday.  A US was ordered to rule out DVT.  Pt did not have SOB yesterday and had a normal RR.  He denies fever, chills, abdominal pain, CP, nausea, or emesis.       The history is provided by the patient.     Review of patient's allergies indicates:  No Known Allergies  Past Medical History:   Diagnosis Date    Gout      History reviewed. No pertinent surgical history.  No family history on file.  Social History     Tobacco Use    Smoking status: Former Smoker    Smokeless tobacco: Never Used    Tobacco comment: has not smoked in  8 years.    Substance Use Topics    Alcohol use: Yes     Comment: weekends     Drug use: Yes     Comment: a long time ago.   not now.      Review of Systems   Constitutional: Negative for chills and fever.   Respiratory: Positive for shortness of breath. Negative for cough.    Cardiovascular: Negative for chest pain, palpitations and leg swelling.   Gastrointestinal: Negative for abdominal pain, nausea and vomiting.   All other systems reviewed and are negative.      Physical Exam     Initial Vitals [05/03/19 2013]   BP Pulse Resp Temp SpO2   (!) 153/93 (!) 111 (!) 24 99.9 °F  (37.7 °C) 96 %      MAP       --         Physical Exam    Nursing note and vitals reviewed.  Constitutional: He appears well-developed and well-nourished.   HENT:   Head: Normocephalic and atraumatic.   Eyes: Conjunctivae are normal.   Neck: Normal range of motion. Neck supple.   Cardiovascular: Intact distal pulses. Tachycardia present.    Pulses:       Radial pulses are 2+ on the right side, and 2+ on the left side.   Pulmonary/Chest: Breath sounds normal. No respiratory distress. He has no wheezes. He has no rhonchi. He has no rales. He exhibits no tenderness.   Abdominal: Soft. Bowel sounds are normal. He exhibits no distension and no mass. There is no tenderness. There is no rebound and no guarding.   Musculoskeletal: Normal range of motion.        Right lower leg: He exhibits no tenderness, no bony tenderness, no swelling, no edema, no deformity and no laceration.        Left lower leg: He exhibits no tenderness, no bony tenderness, no swelling, no edema, no deformity and no laceration.   Neurological: He is alert and oriented to person, place, and time.   Skin: Skin is warm and dry. Capillary refill takes less than 2 seconds. No rash noted.   Psychiatric: He has a normal mood and affect. His behavior is normal.         ED Course   Procedures  Labs Reviewed   TROPONIN ISTAT   POCT CBC   POCT CMP   POCT TROPONIN   POCT B-TYPE NATRIURETIC PEPTIDE (BNP)   POCT CMP   POCT B-TYPE NATRIURETIC PEPTIDE (BNP)               Imaging Results          CTA Chest Non-Coronary (PE Study) (Final result)  Result time 05/03/19 21:03:53    Final result by Anju Manriquez MD (05/03/19 21:03:53)                 Impression:      Limited examination as above described.  No evidence of acute pulmonary embolism in the visualized branches.    Mild nonspecific ground-glass opacity.  Findings may be related to atelectasis, pneumonitis, and less likely pulmonary vascular congestion.  Other etiologies cannot be entirely excluded.  No  focal consolidation.    Hepatic steatosis.      Electronically signed by: Anju Manriquez  Date:    05/03/2019  Time:    21:03             Narrative:    EXAMINATION:  CT PULMONARY ANGIOGRAM WITH CONTRAST    CLINICAL HISTORY:  Shortness of breath.    TECHNIQUE:  CT of the chest with intravenous contrast for pulmonary artery angiogram was performed. Contiguous axial 2.5 mm images followed by 1 mm reconstructions with multiplanar and MIP reformations of the pulmonary arteries. No 3D post-angiographic imaging was performed on an independent workstation and reviewed.  One hundred ml of Omnipaque 350 was injected.    COMPARISON:  None.    FINDINGS:  Examination is limited by suboptimal intravenous bolus and motion artifact.  There is no evidence of pulmonary artery filling defect in the pulmonary trunk, main pulmonary arteries, or segmental branches.  The subsegmental branches and more distal branches cannot be well visualized secondary to the limitations of the study.  There is no aortic aneurysm or aortic dissection.    Bibasilar atelectatic changes are present.  Mild nonspecific diffuse ground-glass opacity is noted.    There is no evidence of mediastinal, hilar, or axillary adenopathy.    There is no pleural or pericardial effusion.    The heart size is within normal limits.    In the visualized upper abdomen, there is fatty infiltration of the liver..    There are multiple marginal osteophytes.                                 Medical Decision Making:   Initial Assessment:   This is a 34 y.o. male who presents to the ED with a chief complaint of acute constant SOB that began last night.  Pt was seen in this ED yesterday for cellulitis and was rx' d clindamycin and lopressor and notes that he began feeling SOB shortly after taking this medication.  Exertion worsens his SOB.  Of notes, Pt was tachycardic and had a high blood pressure in the ED yesterday.  A US was ordered to rule out DVT.  Pt did not have SOB yesterday  and had a normal RR.  He denies fever, chills, abdominal pain, CP, nausea, or emesis.       Clinical Tests:   Lab Tests: Ordered  Radiological Study: Ordered  ED Management:  CBC is within normal limits, CT of chest using PE protocol was ordered secondary to persistent shortness of breath and tachycardia.  No evidence of pulmonary embolus was found, but patient possibly has chronic changes with ground-glass appearance versus pneumonitis.  CMP was within normal limits as well as BNP and troponin was within normal limits. Patient is given instructions for pneumonitis and was advised to follow up with his primary care physician for further evaluation of chronic lung disease.  Rocephin was given in the emergency department the patient was given a prescription for Z-Derrick.  He was advised to follow up with his primary care physician within the next 3 days for re-evaluation and to return to the emergency department if condition worsens.            Scribe Attestation:   Scribe #1: I performed the above scribed service and the documentation accurately describes the services I performed. I attest to the accuracy of the note.    This document was produced by a scribe under my direction and in my presence. I agree with the content of the note and have made any necessary edits.     Dr. London    05/04/2019 4:08 AM             Clinical Impression:     1. Pneumonitis           Disposition:   Disposition: Discharged  Condition: Stable                        Aden London MD  05/04/19 0412

## 2019-05-04 NOTE — ED NOTES
"Pt states earlier today while moving around he felt "winded". Pt states all day after starting new medicine he was prescribed for his BP last night he has been feeling like something is sitting on his chest. Pt denies any other sx currently but still has the sensation of something heavy "on my chest".   "

## 2019-05-23 ENCOUNTER — HOSPITAL ENCOUNTER (EMERGENCY)
Facility: HOSPITAL | Age: 34
Discharge: HOME | End: 2019-05-23
Attending: EMERGENCY MEDICINE
Payer: MEDICAID

## 2019-05-23 VITALS
WEIGHT: 263 LBS | RESPIRATION RATE: 16 BRPM | DIASTOLIC BLOOD PRESSURE: 97 MMHG | HEART RATE: 111 BPM | BODY MASS INDEX: 38.84 KG/M2 | SYSTOLIC BLOOD PRESSURE: 144 MMHG | OXYGEN SATURATION: 98 % | TEMPERATURE: 98 F

## 2019-05-23 DIAGNOSIS — M1A.0710 CHRONIC GOUT OF RIGHT FOOT, UNSPECIFIED CAUSE: ICD-10-CM

## 2019-05-23 DIAGNOSIS — M1A.0620 CHRONIC GOUT OF LEFT KNEE, UNSPECIFIED CAUSE: Primary | ICD-10-CM

## 2019-05-23 PROCEDURE — 99284 EMERGENCY DEPT VISIT MOD MDM: CPT | Mod: ER

## 2019-05-23 RX ORDER — INDOMETHACIN 25 MG/1
50 CAPSULE ORAL
Qty: 30 CAPSULE | Refills: 0 | Status: SHIPPED | OUTPATIENT
Start: 2019-05-23 | End: 2019-11-28 | Stop reason: SDUPTHER

## 2019-05-23 RX ORDER — COLCHICINE 0.6 MG/1
0.6 TABLET ORAL DAILY
Qty: 30 TABLET | Refills: 0 | Status: SHIPPED | OUTPATIENT
Start: 2019-05-23 | End: 2019-11-28 | Stop reason: SDUPTHER

## 2019-05-23 NOTE — ED PROVIDER NOTES
"Encounter Date: 5/23/2019       History     Chief Complaint   Patient presents with    Knee Pain     pt states," My gout in my left knee and right foot is acting up."     Chief complaint:  Gout  34-year-old complains of pain to his left knee and his right foot.  Patient has a history of gout and says that this is similar.  He began having the symptoms last night and the symptoms worsened today.  He is ambulating.  Patient was seen here by me in January and prescribed colchicine and indomethacin.  He tried to refill the prescription but there were no refills.  He has not followed up with his primary care physician.  He denies fever.  His pain is moderate.  He has not taken anything for the pain.    The history is provided by the patient and medical records.     Review of patient's allergies indicates:  No Known Allergies  Past Medical History:   Diagnosis Date    Gout      History reviewed. No pertinent surgical history.  History reviewed. No pertinent family history.  Social History     Tobacco Use    Smoking status: Former Smoker    Smokeless tobacco: Never Used    Tobacco comment: has not smoked in  8 years.    Substance Use Topics    Alcohol use: Yes     Comment: weekends     Drug use: Yes     Comment: a long time ago.   not now.      Review of Systems   Constitutional: Negative for fever.   Musculoskeletal: Positive for joint swelling.   Skin: Negative for color change.       Physical Exam     Initial Vitals [05/23/19 0714]   BP Pulse Resp Temp SpO2   (!) 144/97 (!) 111 16 98 °F (36.7 °C) 98 %      MAP       --         Physical Exam    Nursing note and vitals reviewed.  Constitutional: No distress.   Cardiovascular: Intact distal pulses.   Pulmonary/Chest: No respiratory distress.   Musculoskeletal:        Left knee: He exhibits normal range of motion and no swelling. Tenderness found.        Legs:       Feet:    Neurological: He is alert.   Skin: No erythema.   Psychiatric: He has a normal mood and " affect.         ED Course   Procedures  Labs Reviewed - No data to display       Imaging Results    None          Medical Decision Making:   Initial Assessment:   34-year-old with a longstanding history of gout complains of a flare up.  On exam patient has mild tenderness to his left knee and the dorsum of his right foot without erythema  ED Management:  Patient will be prescribed colchicine and indomethacin.  His creatinine from earlier in the month was normal. He is advised to call his primary care physician today                      Clinical Impression:       ICD-10-CM ICD-9-CM   1. Chronic gout of left knee, unspecified cause M1A.0620 274.02   2. Chronic gout of right foot, unspecified cause M1A.0710 274.02                                Yuliet So MD  05/23/19 0731       Yuliet So MD  05/23/19 0733

## 2019-06-07 ENCOUNTER — HOSPITAL ENCOUNTER (EMERGENCY)
Facility: HOSPITAL | Age: 34
Discharge: HOME | End: 2019-06-07
Attending: EMERGENCY MEDICINE
Payer: MEDICAID

## 2019-06-07 VITALS
OXYGEN SATURATION: 97 % | HEIGHT: 69 IN | TEMPERATURE: 99 F | BODY MASS INDEX: 39.01 KG/M2 | WEIGHT: 263.38 LBS | SYSTOLIC BLOOD PRESSURE: 130 MMHG | HEART RATE: 108 BPM | RESPIRATION RATE: 24 BRPM | DIASTOLIC BLOOD PRESSURE: 79 MMHG

## 2019-06-07 DIAGNOSIS — R00.0 TACHYCARDIA: ICD-10-CM

## 2019-06-07 DIAGNOSIS — N10 ACUTE PYELONEPHRITIS: ICD-10-CM

## 2019-06-07 DIAGNOSIS — N39.0 ACUTE URINARY TRACT INFECTION: Primary | ICD-10-CM

## 2019-06-07 LAB
ALBUMIN SERPL-MCNC: 3.6 G/DL
ALLENS TEST: ABNORMAL
ALP SERPL-CCNC: 57 U/L (ref 42–141)
BILIRUB SERPL-MCNC: 1 MG/DL (ref 0.2–1.6)
BILIRUBIN, POC UA: NEGATIVE
BLOOD, POC UA: ABNORMAL
BUN SERPL-MCNC: 11 MG/DL (ref 7–22)
CALCIUM SERPL-MCNC: 9.6 MG/DL (ref 8–10.3)
CHLORIDE SERPL-SCNC: 104 MMOL/L (ref 98–108)
CK SERPL-CCNC: 164 U/L (ref 20–200)
CLARITY, POC UA: CLEAR
COLOR, POC UA: YELLOW
CREAT SERPL-MCNC: 1.3 MG/DL (ref 0.6–1.2)
CTP QC/QA: YES
CTP QC/QA: YES
FLUAV AG NPH QL: NEGATIVE
FLUBV AG NPH QL: NEGATIVE
GLUCOSE SERPL-MCNC: 117 MG/DL (ref 73–118)
GLUCOSE, POC UA: NEGATIVE
HCO3 UR-SCNC: 23.8 MMOL/L (ref 24–28)
KETONES, POC UA: NEGATIVE
LDH SERPL L TO P-CCNC: 1.17 MMOL/L (ref 0.5–2.2)
LEUKOCYTE EST, POC UA: ABNORMAL
NITRITE, POC UA: NEGATIVE
PCO2 BLDA: 38.5 MMHG (ref 35–45)
PH SMN: 7.4 [PH] (ref 7.35–7.45)
PH UR STRIP: 5.5 [PH]
PO2 BLDA: 38 MMHG (ref 40–60)
POC ALT (SGPT): 67 U/L (ref 10–47)
POC AST (SGOT): 38 U/L (ref 11–38)
POC B-TYPE NATRIURETIC PEPTIDE: 8 PG/ML (ref 0–100)
POC BE: -1 MMOL/L
POC CARDIAC TROPONIN I: 0.01 NG/ML
POC PTINR: 1.1 (ref 0.9–1.2)
POC PTWBT: 12.9 SEC (ref 9.7–14.3)
POC SATURATED O2: 72 % (ref 95–100)
POC TCO2: 25 MMOL/L (ref 24–29)
POC TCO2: 27 MMOL/L (ref 18–33)
POTASSIUM BLD-SCNC: 4.2 MMOL/L (ref 3.6–5.1)
PROTEIN, POC UA: ABNORMAL
PROTEIN, POC: 7.9 G/DL (ref 6.4–8.1)
S PYO RRNA THROAT QL PROBE: NEGATIVE
SAMPLE: ABNORMAL
SAMPLE: NORMAL
SAMPLE: NORMAL
SITE: ABNORMAL
SODIUM BLD-SCNC: 139 MMOL/L (ref 128–145)
SPECIFIC GRAVITY, POC UA: 1.01
UROBILINOGEN, POC UA: 1 E.U./DL

## 2019-06-07 PROCEDURE — 87088 URINE BACTERIA CULTURE: CPT

## 2019-06-07 PROCEDURE — 87077 CULTURE AEROBIC IDENTIFY: CPT

## 2019-06-07 PROCEDURE — 84484 ASSAY OF TROPONIN QUANT: CPT | Mod: ER

## 2019-06-07 PROCEDURE — 87186 SC STD MICRODIL/AGAR DIL: CPT

## 2019-06-07 PROCEDURE — 87040 BLOOD CULTURE FOR BACTERIA: CPT

## 2019-06-07 PROCEDURE — 83880 ASSAY OF NATRIURETIC PEPTIDE: CPT | Mod: ER

## 2019-06-07 PROCEDURE — 93005 ELECTROCARDIOGRAM TRACING: CPT | Mod: ER

## 2019-06-07 PROCEDURE — 82550 ASSAY OF CK (CPK): CPT

## 2019-06-07 PROCEDURE — 96374 THER/PROPH/DIAG INJ IV PUSH: CPT | Mod: ER

## 2019-06-07 PROCEDURE — 99284 EMERGENCY DEPT VISIT MOD MDM: CPT | Mod: 25,ER

## 2019-06-07 PROCEDURE — 85610 PROTHROMBIN TIME: CPT | Mod: ER

## 2019-06-07 PROCEDURE — 81003 URINALYSIS AUTO W/O SCOPE: CPT | Mod: ER

## 2019-06-07 PROCEDURE — 93010 EKG 12-LEAD: ICD-10-PCS | Mod: ,,, | Performed by: INTERNAL MEDICINE

## 2019-06-07 PROCEDURE — 85025 COMPLETE CBC W/AUTO DIFF WBC: CPT | Mod: ER

## 2019-06-07 PROCEDURE — 63600175 PHARM REV CODE 636 W HCPCS: Mod: ER | Performed by: EMERGENCY MEDICINE

## 2019-06-07 PROCEDURE — 82803 BLOOD GASES ANY COMBINATION: CPT | Mod: ER

## 2019-06-07 PROCEDURE — 87086 URINE CULTURE/COLONY COUNT: CPT

## 2019-06-07 PROCEDURE — 87081 CULTURE SCREEN ONLY: CPT

## 2019-06-07 PROCEDURE — 93010 ELECTROCARDIOGRAM REPORT: CPT | Mod: ,,, | Performed by: INTERNAL MEDICINE

## 2019-06-07 PROCEDURE — 87804 INFLUENZA ASSAY W/OPTIC: CPT | Mod: ER

## 2019-06-07 PROCEDURE — 96361 HYDRATE IV INFUSION ADD-ON: CPT | Mod: ER

## 2019-06-07 PROCEDURE — 80053 COMPREHEN METABOLIC PANEL: CPT | Mod: ER

## 2019-06-07 PROCEDURE — 25000003 PHARM REV CODE 250: Mod: ER | Performed by: EMERGENCY MEDICINE

## 2019-06-07 PROCEDURE — 87880 STREP A ASSAY W/OPTIC: CPT | Mod: ER

## 2019-06-07 RX ORDER — ONDANSETRON 8 MG/1
8 TABLET, ORALLY DISINTEGRATING ORAL EVERY 6 HOURS PRN
Qty: 15 TABLET | Refills: 0 | Status: SHIPPED | OUTPATIENT
Start: 2019-06-07 | End: 2019-06-09

## 2019-06-07 RX ORDER — LEVOFLOXACIN 750 MG/1
750 TABLET ORAL DAILY
Qty: 5 TABLET | Refills: 0 | Status: SHIPPED | OUTPATIENT
Start: 2019-06-07 | End: 2019-06-12

## 2019-06-07 RX ORDER — IBUPROFEN 600 MG/1
600 TABLET ORAL
Status: COMPLETED | OUTPATIENT
Start: 2019-06-07 | End: 2019-06-07

## 2019-06-07 RX ORDER — IBUPROFEN 600 MG/1
600 TABLET ORAL EVERY 6 HOURS PRN
Qty: 20 TABLET | Refills: 0 | Status: SHIPPED | OUTPATIENT
Start: 2019-06-07 | End: 2019-11-28 | Stop reason: CLARIF

## 2019-06-07 RX ORDER — CEFTRIAXONE 1 G/1
1 INJECTION, POWDER, FOR SOLUTION INTRAMUSCULAR; INTRAVENOUS
Status: COMPLETED | OUTPATIENT
Start: 2019-06-07 | End: 2019-06-07

## 2019-06-07 RX ORDER — ACETAMINOPHEN 500 MG
1000 TABLET ORAL
Status: COMPLETED | OUTPATIENT
Start: 2019-06-07 | End: 2019-06-07

## 2019-06-07 RX ORDER — ACETAMINOPHEN 500 MG
1000 TABLET ORAL EVERY 6 HOURS PRN
Qty: 30 TABLET | Refills: 0 | Status: SHIPPED | OUTPATIENT
Start: 2019-06-07 | End: 2019-11-28 | Stop reason: CLARIF

## 2019-06-07 RX ADMIN — SODIUM CHLORIDE 1000 ML: 0.9 INJECTION, SOLUTION INTRAVENOUS at 03:06

## 2019-06-07 RX ADMIN — ACETAMINOPHEN 1000 MG: 500 TABLET, FILM COATED ORAL at 03:06

## 2019-06-07 RX ADMIN — CEFTRIAXONE SODIUM 1 G: 1 INJECTION, POWDER, FOR SOLUTION INTRAMUSCULAR; INTRAVENOUS at 04:06

## 2019-06-07 RX ADMIN — IBUPROFEN 600 MG: 600 TABLET, FILM COATED ORAL at 03:06

## 2019-06-07 NOTE — ED PROVIDER NOTES
Encounter Date: 6/7/2019    SCRIBE #1 NOTE: I, Yessica Hinkle, am scribing for, and in the presence of,  Dr. Schaefer . I have scribed the following portions of the note - Other sections scribed: HPI,ROS,PE .       History     Chief Complaint   Patient presents with    Back Pain     Thoracic back pain associated breathing and movement x2 days     35 y/o/m presents with left sided side pain that radiates around his lower back for 2 days. Has also been having fevers, taking medicine with relief but then it returns.Pain worse with breathing. Denies hematuria, SOB, CP, N/V/D.     The history is provided by the patient. No  was used.     Review of patient's allergies indicates:  No Known Allergies  Past Medical History:   Diagnosis Date    Gout      History reviewed. No pertinent surgical history.  No family history on file.  Social History     Tobacco Use    Smoking status: Current Every Day Smoker     Types: Vaping with nicotine    Smokeless tobacco: Never Used    Tobacco comment: has not smoked in  8 years.    Substance Use Topics    Alcohol use: Yes     Comment: weekends     Drug use: Not Currently     Comment: a long time ago.   not now.      Review of Systems   Constitutional: Positive for fever.   HENT: Negative for sore throat.    Respiratory: Negative for shortness of breath.    Cardiovascular: Negative for chest pain.   Gastrointestinal: Negative for diarrhea, nausea and vomiting.   Genitourinary: Positive for flank pain. Negative for dysuria and hematuria.   Musculoskeletal: Negative for back pain.   Skin: Negative for rash.   Neurological: Negative for weakness.   Hematological: Does not bruise/bleed easily.   All other systems reviewed and are negative.      Physical Exam     Initial Vitals [06/07/19 1504]   BP Pulse Resp Temp SpO2   (!) 157/98 (!) 132 18 (!) 101.3 °F (38.5 °C) 96 %      MAP       --         Physical Exam    Nursing note and vitals reviewed.  Constitutional: He  appears well-developed and well-nourished. He is diaphoretic. No distress.   HENT:   Head: Normocephalic and atraumatic.   Right Ear: External ear normal.   Left Ear: External ear normal.   Nose: Nose normal.   Mouth/Throat: Oropharynx is clear and moist.   Eyes: Conjunctivae and EOM are normal. Pupils are equal, round, and reactive to light.   Neck: Normal range of motion. Neck supple.   Cardiovascular: Normal rate, regular rhythm, normal heart sounds and intact distal pulses. Exam reveals no gallop and no friction rub.    No murmur heard.  Pulmonary/Chest: Breath sounds normal. No stridor. No respiratory distress. He has no wheezes. He has no rhonchi. He has no rales. He exhibits no tenderness.   Abdominal: Soft. Bowel sounds are normal. He exhibits no distension and no mass. There is no tenderness. There is CVA tenderness (left ). There is no rebound and no guarding.   Musculoskeletal: Normal range of motion.   Neurological: He is alert and oriented to person, place, and time. No cranial nerve deficit or sensory deficit. GCS score is 15. GCS eye subscore is 4. GCS verbal subscore is 5. GCS motor subscore is 6.   Skin: Skin is warm. Capillary refill takes less than 2 seconds. No rash noted.   Multiple tattoos.  Lower extremities tattoos but no cellulitis.     Psychiatric: He has a normal mood and affect. His behavior is normal.       Patient gave consent to have physical exam performed.      ED Course   Critical Care  Date/Time: 6/7/2019 3:36 PM  Performed by: Belle Schaefer DO  Authorized by: Belle Schaefer DO   Direct patient critical care time: 8 minutes  Additional history critical care time: 8 minutes  Ordering / reviewing critical care time: 8 minutes  Documentation critical care time: 8 minutes  Total critical care time (exclusive of procedural time) : 32 minutes  Critical care time was exclusive of separately billable procedures and treating other patients and teaching time.  Critical care was necessary to  treat or prevent imminent or life-threatening deterioration of the following conditions: sepsis.  Critical care was time spent personally by me on the following activities: development of treatment plan with patient or surrogate, interpretation of cardiac output measurements, evaluation of patient's response to treatment, examination of patient, obtaining history from patient or surrogate, ordering and performing treatments and interventions, ordering and review of laboratory studies, ordering and review of radiographic studies, pulse oximetry, re-evaluation of patient's condition and review of old charts.        Labs Reviewed   POCT URINALYSIS W/O SCOPE - Abnormal; Notable for the following components:       Result Value    Glucose, UA Negative (*)     Bilirubin, UA Negative (*)     Ketones, UA Negative (*)     Blood, UA 2+ (*)     Protein, UA 2+ (*)     Nitrite, UA Negative (*)     Leukocytes, UA 1+ (*)     All other components within normal limits   ISTAT PROCEDURE - Abnormal; Notable for the following components:    POC PO2 38 (*)     POC HCO3 23.8 (*)     POC SATURATED O2 72 (*)     All other components within normal limits   POCT CMP - Abnormal; Notable for the following components:    ALT (SGPT), POC 67 (*)     POC Creatinine 1.3 (*)     All other components within normal limits   CULTURE, BLOOD   CULTURE, BLOOD   CULTURE, URINE   CULTURE, STREP A,  THROAT   TROPONIN ISTAT   CK   POCT CBC   POCT INFLUENZA A/B   POCT RAPID STREP A   POCT URINALYSIS DIPSTICK (CULTURE IF INDICATED)   POCT CMP   POCT PROTIME-INR   POCT B-TYPE NATRIURETIC PEPTIDE (BNP)   POCT TROPONIN   ISTAT PROCEDURE   POCT B-TYPE NATRIURETIC PEPTIDE (BNP)        CBC white blood cell count 11.7, hemoglobin 13.6 hematocrit 41.3 and platelets 160.  Patient was elevated creatinine 1.3.  BUN is 11.  Sodium 139, potassium 4.2, bicarb 27, chloride 104, glucose 117.  Lactic acid is 1.17.  UA glucose negative ketones-2+ blood, 2+ protein and  leukocytes.\    EKG Readings: (Independently Interpreted)   Initial Reading: No STEMI. Rhythm: Sinus Tachycardia. Heart Rate: 132. Axis: Normal.   5/2/19: rate has increased by 20 bpm.   QTC: 426       Imaging Results          X-Ray Chest AP Portable (Final result)  Result time 06/07/19 15:32:02    Final result by Milad Lou MD (06/07/19 15:32:02)                 Impression:      No acute cardiopulmonary disease      Electronically signed by: Milad Lou MD  Date:    06/07/2019  Time:    15:32             Narrative:    EXAMINATION:  XR CHEST AP PORTABLE    CLINICAL HISTORY:  Sepsis;    TECHNIQUE:  Single frontal view of the chest was performed.    COMPARISON:  04/30/2019    FINDINGS:  The heart size and pulmonary vessels are normal.  Mediastinal structures are midline.  Lungs are expanded and clear.  No acute lung consolidation or pleural fluid is detected.  The skeletal structures are intact without acute finding.                                 Medical Decision Making:   Independently Interpreted Test(s):   I have ordered and independently interpreted EKG Reading(s) - see prior notes  Clinical Tests:   Lab Tests: Ordered and Reviewed  Radiological Study: Ordered and Reviewed  Medical Tests: Ordered and Reviewed  ED Management:  Treating with IV fluids, ibuprofen, and acetaminophen. CXR, strep swab, flu swab, and EKG ordered. Blood cultures ordered prior to antibiotics.   5/23/19: Here treated for gout.   CBC white blood cell count 11.7, hemoglobin 13.6 hematocrit 41.3 and platelets 160.  Patient was elevated creatinine 1.3.  BUN is 11.  Sodium 139, potassium 4.2, bicarb 27, chloride 104, glucose 117.  Lactic acid is 1.17.  UA glucose negative ketones-2+ blood, 2+ protein and leukocytes.  When compared to previous BUN and creatinine 05/03/2019 BUN was 12 and creatinine was 1.0.    Differential diagnosis:  Pyelonephritis, urinary tract infection, dehydration, pneumonia, influenza, streptococcal  pharyngitis, fever, sepsis, and dehydration  Treatment in the ED Physical Exam, IV fluids, Tylenol, ibuprofen, and ceftriaxone  Patient reports feeling better after medication.    Patient tolerating p.o. without difficulty.  Patient feeling much better.  Discussed prescriptions, outpatient follow-up, labs, and imaging results.    Fill and take prescriptions as directed.  Return to the ED if symptoms worsen or do not resolve.   Answered questions and discussed discharge plan.    Patient feels better and is ready for discharge.  Follow up with PCP/specialist in 1 day.            Scribe Attestation:   Scribe #1: I performed the above scribed service and the documentation accurately describes the services I performed. I attest to the accuracy of the note.     I, Dr. Belle Schaefer, personally performed the services described in this documentation. This document was produced by a scribe under my direction and in my presence. All medical record entries made by the scribe were at my direction and in my presence.  I have reviewed the chart and agree that the record reflects my personal performance and is accurate and complete. Belle Schaefer DO.     06/07/2019 4:26 PM             Clinical Impression:     1. Acute urinary tract infection    2. Tachycardia    3. Acute pyelonephritis                                   Belle Schaefer DO  06/07/19 [id number]

## 2019-06-08 NOTE — ED NOTES
Positive Blood culture  Anaerobic bottle  Gram negative rods    Dr. London notified to review if future tx is needed.

## 2019-06-09 LAB
BACTERIA BLD CULT: NORMAL
BACTERIA THROAT CULT: NORMAL
BACTERIA UR CULT: NORMAL

## 2019-06-12 LAB — BACTERIA BLD CULT: NORMAL

## 2019-07-12 ENCOUNTER — HOSPITAL ENCOUNTER (EMERGENCY)
Facility: HOSPITAL | Age: 34
Discharge: HOME | End: 2019-07-12
Attending: EMERGENCY MEDICINE
Payer: MEDICAID

## 2019-07-12 VITALS
OXYGEN SATURATION: 98 % | HEART RATE: 90 BPM | TEMPERATURE: 98 F | BODY MASS INDEX: 43.32 KG/M2 | WEIGHT: 260 LBS | HEIGHT: 65 IN | RESPIRATION RATE: 20 BRPM | SYSTOLIC BLOOD PRESSURE: 153 MMHG | DIASTOLIC BLOOD PRESSURE: 97 MMHG

## 2019-07-12 DIAGNOSIS — M10.9 ACUTE GOUT OF LEFT KNEE, UNSPECIFIED CAUSE: Primary | ICD-10-CM

## 2019-07-12 DIAGNOSIS — Z87.39 HISTORY OF GOUT: ICD-10-CM

## 2019-07-12 PROCEDURE — 99283 EMERGENCY DEPT VISIT LOW MDM: CPT | Mod: ER

## 2019-07-12 PROCEDURE — 25000003 PHARM REV CODE 250: Mod: ER | Performed by: PHYSICIAN ASSISTANT

## 2019-07-12 RX ORDER — COLCHICINE 0.6 MG/1
0.6 TABLET ORAL DAILY
Qty: 1 TABLET | Refills: 0 | Status: SHIPPED | OUTPATIENT
Start: 2019-07-12 | End: 2019-07-13

## 2019-07-12 RX ORDER — DICLOFENAC SODIUM 10 MG/G
2 GEL TOPICAL 3 TIMES DAILY
Qty: 100 G | Refills: 0 | Status: SHIPPED | OUTPATIENT
Start: 2019-07-12 | End: 2020-10-29 | Stop reason: SDUPTHER

## 2019-07-12 RX ORDER — COLCHICINE 0.6 MG/1
1.2 TABLET, FILM COATED ORAL
Status: COMPLETED | OUTPATIENT
Start: 2019-07-12 | End: 2019-07-12

## 2019-07-12 RX ADMIN — COLCHICINE 1.2 MG: 0.6 TABLET, FILM COATED ORAL at 10:07

## 2019-07-12 NOTE — ED PROVIDER NOTES
Encounter Date: 7/12/2019       History     Chief Complaint   Patient presents with    Knee Pain     Patient stated he has gout and left knee started flaring up yesterday   patient out of medications     34-year-old male with history of gout presents to emergency department for 1 day history of atraumatic left medial knee pain is similar to his past gout flare-ups.  Denies fever, numbness, hip pain, ankle pain, calf pain.  No history of DVT.  Patient took ibuprofen yesterday with minimal relief of symptoms.  Patient is requesting a refill of colchicine.  Patient is not on allopurinol.  Patient has not followed up with the PCP for these issues.        Review of patient's allergies indicates:  No Known Allergies  Past Medical History:   Diagnosis Date    Gout      History reviewed. No pertinent surgical history.  History reviewed. No pertinent family history.  Social History     Tobacco Use    Smoking status: Current Every Day Smoker     Types: Vaping with nicotine    Smokeless tobacco: Never Used    Tobacco comment: has not smoked in  8 years.    Substance Use Topics    Alcohol use: Yes     Comment: weekends     Drug use: Not Currently     Comment: a long time ago.   not now.      Review of Systems   Constitutional: Negative for fever.   Respiratory: Negative for shortness of breath.    Cardiovascular: Negative for chest pain.   Gastrointestinal: Negative for abdominal pain, nausea and vomiting.   Musculoskeletal: Positive for arthralgias. Negative for back pain, joint swelling and neck pain.   Skin: Negative for color change and wound.   Neurological: Negative for numbness.   All other systems reviewed and are negative.      Physical Exam     Initial Vitals [07/12/19 0930]   BP Pulse Resp Temp SpO2   (!) 153/97 90 20 97.6 °F (36.4 °C) 98 %      MAP       --         Physical Exam    Nursing note and vitals reviewed.  Constitutional: He appears well-developed and well-nourished. He is not diaphoretic. No  distress.   HENT:   Head: Normocephalic and atraumatic.   Nose: Nose normal.   Eyes: Conjunctivae and EOM are normal. Pupils are equal, round, and reactive to light. Right eye exhibits no discharge. Left eye exhibits no discharge.   Neck: Normal range of motion. No tracheal deviation present. No JVD present.   Cardiovascular: Normal rate, regular rhythm and normal heart sounds. Exam reveals no friction rub.    No murmur heard.  Pulmonary/Chest: Breath sounds normal. No stridor. No respiratory distress. He has no wheezes. He has no rhonchi. He has no rales. He exhibits no tenderness.   Musculoskeletal:   No bony tenderness of the left knee.  No swelling or erythema.  No hip, popliteal space, calf, or ankle tenderness. Full ROM of left knee.  Fully bears weight on the left knee ambulates without limp or pain.  Distal lower extremity pulses 2+ and equal. Sensation intact and equal.   Neurological: He is alert and oriented to person, place, and time.   Skin: Skin is warm and dry. No rash and no abscess noted. No erythema. No pallor.         ED Course   Procedures  Labs Reviewed - No data to display       Imaging Results    None          Medical Decision Making:   History:   Old Medical Records: I decided to obtain old medical records.    This is an emergent evaluation of a 34 y.o. male presenting to the ED for L sided knee pain similar to his past gout flare-ups according to patient. Denies other injury, hip pain, ankle pain, fever, inability to bear weight on knee, and numbness/tingling. Afebrile. Patient is non-toxic appearing and in no acute distress. Ambulatory in ED.     Likely gout flare-up as patient suspects.  No septic joint or neurovascular compromise.  No history of trauma. No signs of DVT.    Will offer colchicine therapy as this typically works well for the patient.  Will defer high dose NSAID use as his creatinine was slightly elevated last time it was recorded.  Will offer Voltaren gel instead.  Patient  does not want narcotics. We discussed use of Tylenol.  Advising follow-up PCP.  May benefit from allopurinol therapy in the future.    I discussed with the patient the diagnosis, treatment plan, indications for return to the emergency department, and for expected follow-up. The patient verbalized an understanding. The patient is asked if there are any questions or concerns. We discuss the case, until all issues are addressed to the patients satisfaction. Patient understands and is agreeable to the plan.                    Clinical Impression:       ICD-10-CM ICD-9-CM   1. Acute gout of left knee, unspecified cause M10.9 274.01   2. History of gout Z87.39 V12.29                                Yoav Mendoza PA-C  07/12/19 1125       Yoav Mendoza PA-C  07/12/19 1201

## 2019-11-28 ENCOUNTER — HOSPITAL ENCOUNTER (EMERGENCY)
Facility: HOSPITAL | Age: 34
Discharge: HOME | End: 2019-11-28
Attending: EMERGENCY MEDICINE
Payer: MEDICAID

## 2019-11-28 VITALS
WEIGHT: 270 LBS | OXYGEN SATURATION: 98 % | BODY MASS INDEX: 39.99 KG/M2 | HEIGHT: 69 IN | TEMPERATURE: 98 F | HEART RATE: 110 BPM | SYSTOLIC BLOOD PRESSURE: 168 MMHG | DIASTOLIC BLOOD PRESSURE: 84 MMHG | RESPIRATION RATE: 16 BRPM

## 2019-11-28 DIAGNOSIS — M25.562 LEFT KNEE PAIN: ICD-10-CM

## 2019-11-28 DIAGNOSIS — M25.462 EFFUSION, LEFT KNEE: Primary | ICD-10-CM

## 2019-11-28 DIAGNOSIS — M10.9 ACUTE GOUT OF LEFT KNEE, UNSPECIFIED CAUSE: ICD-10-CM

## 2019-11-28 PROCEDURE — 99284 EMERGENCY DEPT VISIT MOD MDM: CPT | Mod: 25,ER

## 2019-11-28 RX ORDER — INDOMETHACIN 25 MG/1
50 CAPSULE ORAL EVERY 8 HOURS PRN
Qty: 30 CAPSULE | Refills: 0 | Status: SHIPPED | OUTPATIENT
Start: 2019-11-28 | End: 2020-04-24 | Stop reason: SDUPTHER

## 2019-11-28 RX ORDER — COLCHICINE 0.6 MG/1
TABLET ORAL
Qty: 30 TABLET | Refills: 0 | Status: SHIPPED | OUTPATIENT
Start: 2019-11-28 | End: 2020-04-24 | Stop reason: SDUPTHER

## 2019-11-28 RX ORDER — ALLOPURINOL 100 MG/1
100 TABLET ORAL DAILY
COMMUNITY

## 2019-11-28 NOTE — ED PROVIDER NOTES
Encounter Date: 11/28/2019    SCRIBE #1 NOTE: I, Sneha Novak, am scribing for, and in the presence of,  Toussaintussaint Battley, FNP. I have scribed the following portions of the note - Other sections scribed: HPI, ROS, PE.       History     Chief Complaint   Patient presents with    Gout     left knee     Patient has a history of gout.    The history is provided by the patient. No  was used.   Leg Pain    There was no injury mechanism. The incident occurred today. The pain is present in the left knee. The quality of the pain is described as aching. The pain has been constant since onset. Pertinent negatives include no numbness, no inability to bear weight, no loss of motion, no muscle weakness, no loss of sensation and no tingling. He reports no foreign bodies present. The symptoms are aggravated by bearing weight, activity and palpation.     Review of patient's allergies indicates:  No Known Allergies  Past Medical History:   Diagnosis Date    Gout      History reviewed. No pertinent surgical history.  History reviewed. No pertinent family history.  Social History     Tobacco Use    Smoking status: Current Every Day Smoker     Types: Vaping with nicotine    Smokeless tobacco: Never Used    Tobacco comment: has not smoked in  8 years.    Substance Use Topics    Alcohol use: Yes     Comment: weekends     Drug use: Not Currently     Comment: a long time ago.   not now.      Review of Systems   Constitutional: Negative.  Negative for fever.   HENT: Negative.  Negative for sore throat.    Eyes: Negative.  Negative for pain.   Respiratory: Negative.  Negative for shortness of breath.    Cardiovascular: Negative.  Negative for chest pain.   Gastrointestinal: Negative.  Negative for abdominal pain and vomiting.   Genitourinary: Negative.  Negative for dysuria.   Musculoskeletal: Positive for arthralgias (left knee). Negative for back pain.   Skin: Negative.  Negative for rash.   Neurological: Negative.   Negative for tingling, numbness and headaches.   Hematological: Negative.    Psychiatric/Behavioral: Negative.    All other systems reviewed and are negative.      Physical Exam     Initial Vitals [11/28/19 1721]   BP Pulse Resp Temp SpO2   (!) 170/91 (!) 122 18 98.4 °F (36.9 °C) 97 %      MAP       --         Physical Exam    Nursing note and vitals reviewed.  Constitutional: He appears well-developed and well-nourished.   HENT:   Head: Normocephalic and atraumatic.   Right Ear: External ear normal.   Left Ear: External ear normal.   Nose: Nose normal.   Mouth/Throat: Oropharynx is clear and moist.   Eyes: Conjunctivae and EOM are normal. Pupils are equal, round, and reactive to light.   Neck: Normal range of motion. Neck supple.   Cardiovascular: Normal rate, regular rhythm, normal heart sounds and intact distal pulses. Exam reveals no gallop and no friction rub.    No murmur heard.  Pulmonary/Chest: Breath sounds normal. No stridor. No respiratory distress. He has no wheezes. He has no rhonchi. He has no rales. He exhibits no tenderness.   Abdominal: Soft. Bowel sounds are normal. He exhibits no distension and no mass. There is no tenderness. There is no rigidity, no rebound and no guarding.   Musculoskeletal: Normal range of motion.        Left knee: He exhibits effusion and bony tenderness. He exhibits normal range of motion, no swelling, no ecchymosis, no deformity, no laceration, no erythema, normal alignment, no LCL laxity, normal patellar mobility, normal meniscus and no MCL laxity.   Neurological: He is alert and oriented to person, place, and time. No cranial nerve deficit or sensory deficit. GCS score is 15. GCS eye subscore is 4. GCS verbal subscore is 5. GCS motor subscore is 6.   Skin: Skin is warm and dry. Capillary refill takes less than 2 seconds. No rash noted.   Psychiatric: He has a normal mood and affect. His behavior is normal.         ED Course   Procedures  Labs Reviewed - No data to  display       Imaging Results          X-Ray Knee 1 or 2 View Left (Final result)  Result time 11/28/19 17:56:24   Procedure changed from X-Ray Knee 3 View Left     Final result by Emile Gautam MD (11/28/19 17:56:24)                 Impression:      No evidence of fracture or dislocation left knee.    Large suprapatellar joint effusion.      Electronically signed by: Emile Gautam MD  Date:    11/28/2019  Time:    17:56             Narrative:    EXAMINATION:  XR KNEE 1 OR 2 VIEW LEFT    CLINICAL HISTORY:  pain;  Pain in left knee    TECHNIQUE:  Frontal and lateral radiographs of the left knee.    COMPARISON:  None    FINDINGS:  The bone mineralization is within normal limits.  There is no cortical step-off.  There is no evidence of periostitis.  There is a fabella.    There is large suprapatellar joint effusion.  The remaining joint spaces are maintained.  No erosive changes are present.    There is no evidence of a fracture or dislocation or left knee.                                 Medical Decision Making:   History:   Old Medical Records: I decided to obtain old medical records.  Initial Assessment:   Effusion of left knee, gout of left knee, left knee pain  Differential Diagnosis:   Fracture, dislocation  Clinical Tests:   Radiological Study: Ordered and Reviewed  ED Management:  No evidence of septic joints the patient has full range of motion and no erythema is noted. Repeat pulse is 110.  Patient will be discharged home with a prescription of colchicine and indomethacin with instructions to follow up with Dr. Mal Scott, Ortho, tomorrow, implement RICE, and return to ER as needed if symptoms worsen or fail to improve.  The patient verbalized understanding of discharge instructions and treatment plan.            Scribe Attestation:   Scribe #1: I performed the above scribed service and the documentation accurately describes the services I performed. I attest to the accuracy of the note.                           Clinical Impression:     1. Effusion, left knee    2. Left knee pain    3. Acute gout of left knee, unspecified cause                                Toussaint Battley III, [facility]  11/28/19 3765

## 2019-12-09 ENCOUNTER — HOSPITAL ENCOUNTER (EMERGENCY)
Facility: HOSPITAL | Age: 34
Discharge: HOME | End: 2019-12-09
Attending: EMERGENCY MEDICINE
Payer: MEDICAID

## 2019-12-09 VITALS
OXYGEN SATURATION: 97 % | WEIGHT: 265 LBS | BODY MASS INDEX: 39.13 KG/M2 | DIASTOLIC BLOOD PRESSURE: 103 MMHG | RESPIRATION RATE: 19 BRPM | HEART RATE: 99 BPM | SYSTOLIC BLOOD PRESSURE: 148 MMHG | TEMPERATURE: 98 F

## 2019-12-09 DIAGNOSIS — J10.1 INFLUENZA A: Primary | ICD-10-CM

## 2019-12-09 LAB
CTP QC/QA: YES
POC MOLECULAR INFLUENZA A AGN: POSITIVE
POC MOLECULAR INFLUENZA B AGN: NEGATIVE

## 2019-12-09 PROCEDURE — 87804 INFLUENZA ASSAY W/OPTIC: CPT | Mod: ER

## 2019-12-09 PROCEDURE — 87502 INFLUENZA DNA AMP PROBE: CPT | Mod: ER

## 2019-12-09 PROCEDURE — 99284 EMERGENCY DEPT VISIT MOD MDM: CPT | Mod: 25,ER

## 2019-12-09 RX ORDER — OSELTAMIVIR PHOSPHATE 75 MG/1
75 CAPSULE ORAL 2 TIMES DAILY
Qty: 10 CAPSULE | Refills: 0 | Status: SHIPPED | OUTPATIENT
Start: 2019-12-09 | End: 2019-12-14

## 2019-12-09 RX ORDER — IBUPROFEN 800 MG/1
800 TABLET ORAL EVERY 6 HOURS PRN
Qty: 24 TABLET | Refills: 0 | OUTPATIENT
Start: 2019-12-09 | End: 2020-04-24

## 2019-12-09 RX ORDER — PROMETHAZINE HYDROCHLORIDE AND DEXTROMETHORPHAN HYDROBROMIDE 6.25; 15 MG/5ML; MG/5ML
5 SYRUP ORAL EVERY 6 HOURS PRN
Qty: 150 ML | Refills: 0 | Status: SHIPPED | OUTPATIENT
Start: 2019-12-09 | End: 2019-12-19

## 2019-12-09 NOTE — ED PROVIDER NOTES
Encounter Date: 12/9/2019    SCRIBE #1 NOTE: I, Marguerite Mcclure, am scribing for, and in the presence of,  DINH Linda. I have scribed the following portions of the note - Other sections scribed: HPI, ROS, PE.       History     Chief Complaint   Patient presents with    Nasal Congestion     pt to ED reporting cold like s/s x4 days. last medication was robitussin aobut 0700.     Cough     A 34-year-old male presents to the ED with nasal congestion and cough for 4 days.  Patient reports body aches and chills. Patient has taken Robitussin with no relief.    The history is provided by the patient. No  was used.   Cough   This is a new problem. Illness onset: 4 days ago. The problem occurs constantly. The cough is non-productive. There has been no fever. Associated symptoms include chills. Pertinent negatives include no chest pain and no shortness of breath. Associated symptoms comments: Positive for nasal congestion. He has tried cough syrup (Robitussin) for the symptoms. The treatment provided no relief.     Review of patient's allergies indicates:  No Known Allergies  Past Medical History:   Diagnosis Date    Gout      No past surgical history on file.  No family history on file.  Social History     Tobacco Use    Smoking status: Current Every Day Smoker     Types: Vaping with nicotine    Smokeless tobacco: Never Used    Tobacco comment: has not smoked in  8 years.    Substance Use Topics    Alcohol use: Yes     Comment: weekends     Drug use: Not Currently     Comment: a long time ago.   not now.      Review of Systems   Constitutional: Positive for chills.        Body   HENT: Positive for congestion.    Eyes: Negative.    Respiratory: Positive for cough. Negative for shortness of breath.    Cardiovascular: Negative.  Negative for chest pain.   Gastrointestinal: Negative.    Endocrine: Negative.    Genitourinary: Negative.    Musculoskeletal: Negative.    Skin: Negative.     Allergic/Immunologic: Negative.    Neurological: Negative.    Hematological: Negative.    Psychiatric/Behavioral: Negative.    All other systems reviewed and are negative.      Physical Exam     Initial Vitals [12/09/19 1034]   BP Pulse Resp Temp SpO2   (!) 147/97 (!) 113 18 99.8 °F (37.7 °C) 97 %      MAP       --         Physical Exam    Nursing note and vitals reviewed.  Constitutional: He appears well-developed.   HENT:   Right Ear: External ear normal.   Left Ear: External ear normal.   Nose: Mucosal edema present.   Mouth/Throat: Uvula is midline, oropharynx is clear and moist and mucous membranes are normal.   Eyes: Conjunctivae are normal.   Neck: Normal range of motion. Neck supple.   Cardiovascular: Normal rate, regular rhythm, S1 normal, S2 normal and normal heart sounds.   No murmur heard.  Pulmonary/Chest: Effort normal and breath sounds normal.   Abdominal: Soft. There is no tenderness.   Musculoskeletal: Normal range of motion. He exhibits no edema or tenderness.   Skin: Skin is warm and dry. Capillary refill takes less than 2 seconds. No rash noted.   Psychiatric: He has a normal mood and affect. His behavior is normal.         ED Course   Procedures  Labs Reviewed   POCT INFLUENZA A/B MOLECULAR - Abnormal; Notable for the following components:       Result Value    POC Molecular Influenza A Ag Positive (*)     All other components within normal limits          Imaging Results    None       Imaging Results          X-Ray Chest PA And Lateral (Final result)  Result time 12/09/19 12:43:19    Final result by Jluis Moura MD (12/09/19 12:43:19)                 Impression:      No acute chest disease identified.  No detrimental change is noted when compared to 06/07/2019.      Electronically signed by: Jluis Moura MD  Date:    12/09/2019  Time:    12:43             Narrative:    EXAMINATION:  XR CHEST PA AND LATERAL    CLINICAL HISTORY:  cough;    TECHNIQUE:  PA and lateral views of the chest were  performed.    COMPARISON:  06/07/2019.    FINDINGS:  The cardiac silhouette and superior mediastinal structures are unremarkable. Pulmonary vasculature is within normal limits. The lungs are well aerated and free of focal consolidations. There is no evidence for pneumothorax or pleural effusions. Bony structures are grossly intact.                                  Medical Decision Making:   History:   Old Medical Records: I decided to obtain old medical records.  Initial Assessment:   A 34-year-old male presents to the ED with nasal congestion and cough for 4 days.  Patient reports body aches and chills. Patient has taken Robitussin with no relief.    Differential Diagnosis:   Influenza A, Influenza B, upper respiratory infection, acute sinusitis, bronchitis, pneumonia  Clinical Tests:   Lab Tests: Ordered and Reviewed  Radiological Study: Ordered and Reviewed  ED Management:  Discharge home with Tamiflu, Bromfed DM., and Motrin.   Follow-up with PCP in 2 days.  Return ED for worsening symptoms.            Scribe Attestation:   Scribe #1: I performed the above scribed service and the documentation accurately describes the services I performed. I attest to the accuracy of the note.    This document was produced by a scribe under my direction and in my presence. I agree with the content of the note and have made any necessary edits.     DINH Linda    12/09/2019 9:26 PM                      Clinical Impression:     1. Influenza A                                DINH Raymundo  12/09/19 2126

## 2019-12-09 NOTE — DISCHARGE INSTRUCTIONS
Follow-up with PCP in 2 days.  Return ED for worsening your symptoms to included increased coughing, fever, shortness of breath.

## 2020-04-16 ENCOUNTER — HOSPITAL ENCOUNTER (EMERGENCY)
Facility: HOSPITAL | Age: 35
Discharge: HOME | End: 2020-04-16
Attending: EMERGENCY MEDICINE
Payer: MEDICAID

## 2020-04-16 VITALS
HEIGHT: 69 IN | OXYGEN SATURATION: 98 % | DIASTOLIC BLOOD PRESSURE: 87 MMHG | RESPIRATION RATE: 18 BRPM | SYSTOLIC BLOOD PRESSURE: 140 MMHG | BODY MASS INDEX: 39.25 KG/M2 | TEMPERATURE: 98 F | WEIGHT: 265 LBS | HEART RATE: 97 BPM

## 2020-04-16 DIAGNOSIS — Z20.2 EXPOSURE TO STD: Primary | ICD-10-CM

## 2020-04-16 PROCEDURE — 96372 THER/PROPH/DIAG INJ SC/IM: CPT | Mod: ER

## 2020-04-16 PROCEDURE — 63600175 PHARM REV CODE 636 W HCPCS: Mod: ER | Performed by: PHYSICIAN ASSISTANT

## 2020-04-16 PROCEDURE — 99284 EMERGENCY DEPT VISIT MOD MDM: CPT | Mod: 25,ER

## 2020-04-16 PROCEDURE — 25000003 PHARM REV CODE 250: Mod: ER | Performed by: PHYSICIAN ASSISTANT

## 2020-04-16 PROCEDURE — 63700000 PHARM REV CODE 250 ALT 637 W/O HCPCS: Mod: ER | Performed by: PHYSICIAN ASSISTANT

## 2020-04-16 RX ORDER — AZITHROMYCIN 250 MG/1
1000 TABLET, FILM COATED ORAL
Status: COMPLETED | OUTPATIENT
Start: 2020-04-16 | End: 2020-04-16

## 2020-04-16 RX ORDER — METRONIDAZOLE 500 MG/1
2000 TABLET ORAL
Status: COMPLETED | OUTPATIENT
Start: 2020-04-16 | End: 2020-04-16

## 2020-04-16 RX ORDER — CEFTRIAXONE 250 MG/1
250 INJECTION, POWDER, FOR SOLUTION INTRAMUSCULAR; INTRAVENOUS
Status: COMPLETED | OUTPATIENT
Start: 2020-04-16 | End: 2020-04-16

## 2020-04-16 RX ORDER — LIDOCAINE HYDROCHLORIDE 10 MG/ML
5 INJECTION INFILTRATION; PERINEURAL
Status: DISCONTINUED | OUTPATIENT
Start: 2020-04-16 | End: 2020-04-16

## 2020-04-16 RX ORDER — ONDANSETRON 4 MG/1
4 TABLET, ORALLY DISINTEGRATING ORAL
Status: COMPLETED | OUTPATIENT
Start: 2020-04-16 | End: 2020-04-16

## 2020-04-16 RX ADMIN — CEFTRIAXONE SODIUM 250 MG: 250 INJECTION, POWDER, FOR SOLUTION INTRAMUSCULAR; INTRAVENOUS at 08:04

## 2020-04-16 RX ADMIN — METRONIDAZOLE 2000 MG: 500 TABLET ORAL at 08:04

## 2020-04-16 RX ADMIN — AZITHROMYCIN MONOHYDRATE 1000 MG: 250 TABLET ORAL at 08:04

## 2020-04-16 RX ADMIN — ONDANSETRON 4 MG: 4 TABLET, ORALLY DISINTEGRATING ORAL at 08:04

## 2020-04-17 NOTE — DISCHARGE INSTRUCTIONS
Thank you for coming to our Emergency Department today. It is important to remember that some problems are difficult to diagnose and may not be found during your first visit. Be sure to follow up with your primary care doctor and review any labs/imaging that was performed with them. If you do not have a primary care doctor, you may contact the one listed on your discharge paperwork or you may also call the [facility] Appointment Desk at [phone] to schedule an appointment with one.     All medications may potentially have side effects and it is impossible to predict which medications may give you side effects. If you feel that you are having a negative effect of any medication you should immediately stop taking them and seek medical attention.    Return to the ER with any questions/concerns, new/concerning symptoms, worsening or failure to improve. Do not drive or make any important decisions for 24 hours if you have received any pain medications, sedatives or mood altering drugs during your ER visit.

## 2020-04-17 NOTE — ED NOTES
LOC: The patient is awake, alert and aware of environment with an appropriate affect, the patient is oriented x 3 and speaking appropriately.  APPEARANCE: Patient resting comfortably and in no acute distress, patient is clean and well groomed, patient's clothing is properly fastened.  SKIN: The skin is warm and dry, patient has normal skin turgor and moist mucus membranes, skin intact, no breakdown or brusing noted.  MUSKULOSKELETAL: Patient moving all extremities well, no obvious swelling or deformities noted.  RESPIRATORY: Airway is open and patent, respirations are spontaneous, patient has a normal effort and rate. Breath sounds are clear and equal bilaterally.  CARDIAC: Normal heart sounds. No peripheral edema.  ABDOMEN: Soft and non tender to palpation, no distention noted. Bowel sounds present. Denies any genitourinary complaints at this time.   NEURO: No neuro deficits, hand grasp equal, no drift noted, no facial droop noted. Speech is clear.

## 2020-04-17 NOTE — ED PROVIDER NOTES
"Encounter Date: 4/16/2020       History     Chief Complaint   Patient presents with    Exposure to STD     pt presents to ED stating he wants to get checked for an STD, states that his partner called him to say she was diagnosed with STD but pt is unsure what it is. Denies any penile symptoms at this time.      35 y.o. male Past Medical History:  No date: Gout     States that his partner was diagnosed with an STD however he is unsure what she tested positive for. He states that he wants to "get checked". Pt denies any discharge/pain/ulcerations/dysuria.        Review of patient's allergies indicates:  No Known Allergies  Past Medical History:   Diagnosis Date    Gout      No past surgical history on file.  No family history on file.  Social History     Tobacco Use    Smoking status: Current Every Day Smoker     Types: Vaping with nicotine    Smokeless tobacco: Never Used    Tobacco comment: has not smoked in  8 years.    Substance Use Topics    Alcohol use: Yes     Comment: weekends     Drug use: Not Currently     Comment: a long time ago.   not now.      Review of Systems   Constitutional: Negative for fever.   HENT: Negative for sore throat.    Respiratory: Negative for shortness of breath.    Cardiovascular: Negative for chest pain.   Gastrointestinal: Negative for nausea.   Genitourinary: Negative for dysuria.   Musculoskeletal: Negative for back pain.   Skin: Negative for rash.   Neurological: Negative for weakness.   Hematological: Does not bruise/bleed easily.   All other systems reviewed and are negative.      Physical Exam     Initial Vitals [04/16/20 1944]   BP Pulse Resp Temp SpO2   (!) 140/87 (!) 125 18 98.4 °F (36.9 °C) 98 %      MAP       --         Physical Exam    Nursing note and vitals reviewed.  Constitutional: He appears well-developed and well-nourished.   HENT:   Head: Normocephalic and atraumatic.   Eyes: EOM are normal. Pupils are equal, round, and reactive to light.   Cardiovascular: " Normal rate and regular rhythm.   Pulmonary/Chest: Effort normal.   Abdominal: He exhibits no distension.   Musculoskeletal: He exhibits no edema or tenderness.   Neurological: He is alert and oriented to person, place, and time. No cranial nerve deficit.   Skin: Skin is warm and dry.   Psychiatric: He has a normal mood and affect.     , pt slightly anxious    ED Course   Procedures  Labs Reviewed - No data to display       Imaging Results    None                       Attending Attestation:   Physician Attestation Statement for Resident:  As the supervising MD  I agree with the above history. -:   As the supervising MD I agree with the above PE.    As the supervising MD I agree with the above treatment, course, plan, and disposition.   -: I have staffed the patient with the midlevel provider and was available for consultation in the department. I have guided the treatment plan and agree with the care provided.                                Have d/w pt the need to use protection, f/u with [facility] for screening. Will treat empirically with ceftriaxone/zithromax/flagyl/zofran    Clinical Impression:       ICD-10-CM ICD-9-CM   1. Exposure to STD Z20.2 V01.6             ED Disposition Condition    Discharge Stable        ED Prescriptions     None        Follow-up Information     Follow up With Specialties Details Why Contact Info    [facility]                                         Luis Miguel Gonzales PA-C  04/16/20 2002       Nacho Herring MD  04/16/20 2003

## 2020-04-24 ENCOUNTER — HOSPITAL ENCOUNTER (EMERGENCY)
Facility: HOSPITAL | Age: 35
Discharge: HOME | End: 2020-04-24
Attending: EMERGENCY MEDICINE
Payer: MEDICAID

## 2020-04-24 VITALS
TEMPERATURE: 98 F | WEIGHT: 260 LBS | BODY MASS INDEX: 38.51 KG/M2 | OXYGEN SATURATION: 96 % | SYSTOLIC BLOOD PRESSURE: 123 MMHG | HEART RATE: 98 BPM | RESPIRATION RATE: 18 BRPM | HEIGHT: 69 IN | DIASTOLIC BLOOD PRESSURE: 84 MMHG

## 2020-04-24 DIAGNOSIS — M10.9 ACUTE GOUT, UNSPECIFIED CAUSE, UNSPECIFIED SITE: Primary | ICD-10-CM

## 2020-04-24 PROCEDURE — 25000003 PHARM REV CODE 250: Mod: ER | Performed by: EMERGENCY MEDICINE

## 2020-04-24 PROCEDURE — 63600175 PHARM REV CODE 636 W HCPCS: Mod: ER | Performed by: EMERGENCY MEDICINE

## 2020-04-24 PROCEDURE — 96372 THER/PROPH/DIAG INJ SC/IM: CPT | Mod: 59,ER

## 2020-04-24 PROCEDURE — 99284 EMERGENCY DEPT VISIT MOD MDM: CPT | Mod: 25,ER

## 2020-04-24 RX ORDER — INDOMETHACIN 25 MG/1
50 CAPSULE ORAL EVERY 8 HOURS PRN
Qty: 30 CAPSULE | Refills: 0 | Status: SHIPPED | OUTPATIENT
Start: 2020-04-24 | End: 2020-10-29 | Stop reason: SDUPTHER

## 2020-04-24 RX ORDER — COLCHICINE 0.6 MG/1
1.2 TABLET, FILM COATED ORAL
Status: COMPLETED | OUTPATIENT
Start: 2020-04-24 | End: 2020-04-24

## 2020-04-24 RX ORDER — DEXAMETHASONE SODIUM PHOSPHATE 4 MG/ML
12 INJECTION, SOLUTION INTRA-ARTICULAR; INTRALESIONAL; INTRAMUSCULAR; INTRAVENOUS; SOFT TISSUE
Status: COMPLETED | OUTPATIENT
Start: 2020-04-24 | End: 2020-04-24

## 2020-04-24 RX ORDER — FAMOTIDINE 20 MG/1
20 TABLET, FILM COATED ORAL 2 TIMES DAILY
Qty: 30 TABLET | Refills: 0 | Status: SHIPPED | OUTPATIENT
Start: 2020-04-24 | End: 2020-10-29 | Stop reason: SDUPTHER

## 2020-04-24 RX ORDER — COLCHICINE 0.6 MG/1
TABLET ORAL
Qty: 30 TABLET | Refills: 0 | Status: SHIPPED | OUTPATIENT
Start: 2020-04-24 | End: 2020-10-29 | Stop reason: SDUPTHER

## 2020-04-24 RX ADMIN — DEXAMETHASONE SODIUM PHOSPHATE 12 MG: 4 INJECTION INTRA-ARTICULAR; INTRALESIONAL; INTRAMUSCULAR; INTRAVENOUS; SOFT TISSUE at 11:04

## 2020-04-24 RX ADMIN — COLCHICINE 1.2 MG: 0.6 TABLET, FILM COATED ORAL at 11:04

## 2020-04-24 NOTE — ED PROVIDER NOTES
"Encounter Date: 4/24/2020       History     Chief Complaint   Patient presents with    Medication Refill     PT WOULD LIKE A REFILL OF HIS COLCHICINE WHICH WAS PRESCRIBED HERE 11/2019; PT STATES HE HAS A PCP BUT DID NOT F/U FOR REFILLS;    Knee Pain     "I HAVE A GOUT FLARE UP IN MY KNEE"; PT WITH LEFT KNEE TENDERNESS SINCE LAST NIGHT;      35 y.o. male Past Medical History:  No date: Gout     Notes L knee pain since last night c/w gout flare. No f/c, n/v, diarrhea/dysuria or other complaints.        Review of patient's allergies indicates:  No Known Allergies  Past Medical History:   Diagnosis Date    Gout      History reviewed. No pertinent surgical history.  No family history on file.  Social History     Tobacco Use    Smoking status: Former Smoker     Types: Vaping with nicotine    Smokeless tobacco: Never Used    Tobacco comment: has not smoked in  8 years.    Substance Use Topics    Alcohol use: Yes     Comment: weekends     Drug use: Yes     Types: Marijuana     Comment: DAILY     Review of Systems   Constitutional: Negative for fever.   HENT: Negative for sore throat.    Respiratory: Negative for shortness of breath.    Cardiovascular: Negative for chest pain.   Gastrointestinal: Negative for nausea.   Genitourinary: Negative for dysuria.   Musculoskeletal: Negative for back pain.   Skin: Negative for rash.   Neurological: Negative for weakness.   Hematological: Does not bruise/bleed easily.   All other systems reviewed and are negative.      Physical Exam     Initial Vitals [04/24/20 1141]   BP Pulse Resp Temp SpO2   123/84 98 18 98.2 °F (36.8 °C) 96 %      MAP       --         Physical Exam    Nursing note and vitals reviewed.  Constitutional: He appears well-developed and well-nourished.   HENT:   Head: Normocephalic and atraumatic.   Eyes: EOM are normal. Pupils are equal, round, and reactive to light.   Cardiovascular: Normal rate and regular rhythm.   Pulmonary/Chest: Effort normal. "   Abdominal: He exhibits no distension.   Musculoskeletal: He exhibits no edema or tenderness.   Neurological: He is alert and oriented to person, place, and time. No cranial nerve deficit.   Skin: Skin is warm and dry.   Psychiatric: He has a normal mood and affect.     full rom L knee, ambulates with nl gait/balance    ED Course   Procedures  Labs Reviewed - No data to display       Imaging Results    None                                    Will refill colchicine, write for prednisone and indocine.      Clinical Impression:       ICD-10-CM ICD-9-CM   1. Acute gout, unspecified cause, unspecified site M10.9 274.01                                Nacho Herring MD  04/24/20 1149

## 2020-04-24 NOTE — DISCHARGE INSTRUCTIONS
Thank you for coming to our Emergency Department today. It is important to remember that some problems are difficult to diagnose and may not be found during your first visit. Be sure to follow up with your primary care doctor and review any labs/imaging that was performed with them. If you do not have a primary care doctor, you may contact the one listed on your discharge paperwork or you may also call the [facility] Appointment Desk at [phone] to schedule an appointment with one.     All medications may potentially have side effects and it is impossible to predict which medications may give you side effects. If you feel that you are having a negative effect of any medication you should immediately stop taking them and seek medical attention.    Return to the ER with any questions/concerns, new/concerning symptoms, worsening or failure to improve. Do not drive or make any important decisions for 24 hours if you have received any pain medications, sedatives or mood altering drugs during your ER visit.

## 2020-10-29 ENCOUNTER — HOSPITAL ENCOUNTER (EMERGENCY)
Facility: HOSPITAL | Age: 35
Discharge: HOME | End: 2020-10-29
Attending: EMERGENCY MEDICINE
Payer: MEDICAID

## 2020-10-29 VITALS
HEIGHT: 69 IN | OXYGEN SATURATION: 100 % | RESPIRATION RATE: 19 BRPM | BODY MASS INDEX: 36.29 KG/M2 | DIASTOLIC BLOOD PRESSURE: 99 MMHG | TEMPERATURE: 98 F | SYSTOLIC BLOOD PRESSURE: 183 MMHG | WEIGHT: 245 LBS | HEART RATE: 88 BPM

## 2020-10-29 DIAGNOSIS — I10 ESSENTIAL HYPERTENSION: ICD-10-CM

## 2020-10-29 DIAGNOSIS — M10.9 ACUTE GOUTY ARTHRITIS: Primary | ICD-10-CM

## 2020-10-29 PROCEDURE — 96372 THER/PROPH/DIAG INJ SC/IM: CPT | Mod: ER

## 2020-10-29 PROCEDURE — 63600175 PHARM REV CODE 636 W HCPCS: Mod: ER | Performed by: EMERGENCY MEDICINE

## 2020-10-29 PROCEDURE — 25000003 PHARM REV CODE 250: Mod: ER | Performed by: EMERGENCY MEDICINE

## 2020-10-29 PROCEDURE — 99284 EMERGENCY DEPT VISIT MOD MDM: CPT | Mod: 25,ER

## 2020-10-29 RX ORDER — METOPROLOL SUCCINATE 25 MG/1
25 TABLET, EXTENDED RELEASE ORAL DAILY
Qty: 90 TABLET | Refills: 0 | Status: SHIPPED | OUTPATIENT
Start: 2020-10-29 | End: 2021-01-27

## 2020-10-29 RX ORDER — INDOMETHACIN 25 MG/1
50 CAPSULE ORAL EVERY 8 HOURS PRN
Qty: 30 CAPSULE | Refills: 1 | OUTPATIENT
Start: 2020-10-29 | End: 2022-08-07

## 2020-10-29 RX ORDER — COLCHICINE 0.6 MG/1
TABLET ORAL
Qty: 30 TABLET | Refills: 3 | Status: SHIPPED | OUTPATIENT
Start: 2020-10-29

## 2020-10-29 RX ORDER — FAMOTIDINE 20 MG/1
20 TABLET, FILM COATED ORAL 2 TIMES DAILY
Qty: 180 TABLET | Refills: 3 | OUTPATIENT
Start: 2020-10-29 | End: 2022-08-07

## 2020-10-29 RX ORDER — COLCHICINE 0.6 MG/1
0.6 TABLET, FILM COATED ORAL
Status: COMPLETED | OUTPATIENT
Start: 2020-10-29 | End: 2020-10-29

## 2020-10-29 RX ORDER — DEXAMETHASONE SODIUM PHOSPHATE 4 MG/ML
6 INJECTION, SOLUTION INTRA-ARTICULAR; INTRALESIONAL; INTRAMUSCULAR; INTRAVENOUS; SOFT TISSUE
Status: COMPLETED | OUTPATIENT
Start: 2020-10-29 | End: 2020-10-29

## 2020-10-29 RX ORDER — DICLOFENAC SODIUM 10 MG/G
2 GEL TOPICAL 3 TIMES DAILY
Qty: 100 G | Refills: 0 | Status: SHIPPED | OUTPATIENT
Start: 2020-10-29 | End: 2020-11-08

## 2020-10-29 RX ADMIN — COLCHICINE 0.6 MG: 0.6 TABLET, FILM COATED ORAL at 08:10

## 2020-10-29 RX ADMIN — DEXAMETHASONE SODIUM PHOSPHATE 6 MG: 4 INJECTION, SOLUTION INTRA-ARTICULAR; INTRALESIONAL; INTRAMUSCULAR; INTRAVENOUS; SOFT TISSUE at 08:10

## 2020-10-29 NOTE — ED PROVIDER NOTES
EM PHYSICIAN NOTE    HPI  This patient presents with a complaint of   Chief Complaint   Patient presents with    Gout     pain to right great toe, increasing since yesterday am, ran out of Rx yesterday       HPI:  This is a 35-year-old gentleman with a history of gout and hypertension who presents the emergency department with a complaint of sharp right great toe pain similar to the gout which started yesterday.  He ran out of his colchicine.  The pain is been constant and severe, worsening over the past 24 hr.  No history of trauma.  Typically when he takes colchicine his pain improves after the 2nd dose.  Patient also reports that he had been taking his blood pressure medicine because it makes him feel woozy and he works on ladders.    REVIEW of PMH, SOC History and Family History:  Past Medical History:   Diagnosis Date    Gout      History reviewed. No pertinent surgical history.  Review of patient's allergies indicates:  No Known Allergies    Family history and social history reviewed.      REVIEW of SYSTEMS  Source:  Patient  The nurse's notes and triage vital signs were reviewed.  GENERAL/CONSTITUTIONAL: There is no report of fever, fatigue, weakness, or unexplained weight loss.  CARDIOVASCULAR: There is no report of chest pain   RESPIRATORY: There is no report of cough or SOB  GASTROINTESTINAL: There is no report of nausea, vomiting, diarrhea  MUSCULOSKELETAL:  See HPI  SKIN AND BREASTS: There is no report of easy bruising, skin redness, skin rash.  The remainder of the ROS is negative.        PHYSICAL EXAMINATION  Temp:  [98.1 °F (36.7 °C)] 98.1 °F (36.7 °C)  Pulse:  [88] 88  Resp:  [19] 19  SpO2:  [100 %] 100 %  BP: (183)/(99) 183/99    Vital signs and Pulse Ox reviewed in clinical context. Abnormalities noted: Hypertension noted and patient will be referred to primary care physician for close follow-up  Pt's level of consciousness is alert, and the patient is in moderate distress.  GCS: 4 - Opens eyes  on own, 6 - Follows simple motor commands, 5 - Alert and oriented = total 15  Skin: warm, pink and dry  Mucosa:moist  Head and Neck: WNL  Cardiac exam: RRR  Pulmonary exam: unlabored and clear  Abd Exam: soft nontender   Musculoskeletal:  Right great toe is swollen and tender and erythematous at the metatarsophalangeal junction   Neurologic: GCS 15. 5 over 5 strength, cranial nerves intact, neck supple       Initial Impression:  Gouty arthritis, hypertension  Plan:  Colchicine and Decadron  Sachin Felton MD, 7:53 AM 10/29/2020      Medical decision making:  I discussed with the patient at length the importance of complying with his blood pressure medicines.  He reports that he will attempt to try his medicines at night so that he does not have feelings of wooziness during the day when he is working.  He also understands it is important to follow-up with his primary care.  There is no history of trauma therefore I am not ordering an x-ray in this patient who report that his symptoms are consistent with gout.  His physical exam is consistent with gout.  Nurses notes and Vital Signs reviewed.  Orders Placed This Encounter   Procedures    Ambulatory referral/consult to Family Practice            Diagnoses that have been ruled out:   None   Diagnoses that are still under consideration:   None   Final diagnoses:   Acute gouty arthritis   Essential hypertension            Follow-up Information     Follow up With Specialties Details Why Contact Info    Call for appointment  In 3 days For Follow-up and Recheck Call for Primary Care Clinic follow-up this week.  [phone]        ED Prescriptions     Medication Sig Dispense Start Date End Date Auth. Provider    colchicine (COLCRYS) 0.6 mg tablet Take 2 tablets by mouth at the onset of symptoms and may repeat with 1 tablet after 1 hr.  Do not take any more than 3 tablets within a 24 hr period. 30 tablet 10/29/2020  Sachin Felton MD    diclofenac sodium (VOLTAREN) 1 %  Gel Apply 2 g topically 3 (three) times daily. for 10 days 100 g 10/29/2020 11/8/2020 Sachin Felton MD    indomethacin (INDOCIN) 25 MG capsule Take 2 capsules (50 mg total) by mouth every 8 (eight) hours as needed (pain). Take with food to prevent stomach ulcer/bleed 30 capsule 10/29/2020  Sachin Felton MD    metoprolol succinate (TOPROL-XL) 25 MG 24 hr tablet Take 1 tablet (25 mg total) by mouth once daily. 90 tablet 10/29/2020 1/27/2021 Sachin Felton MD    famotidine (PEPCID) 20 MG tablet Take 1 tablet (20 mg total) by mouth 2 (two) times daily. While taking indomethacin 180 tablet 10/29/2020 10/29/2021 Sachin Felton MD          This note was created using Dictation Software.  This program may occasionally misinterpret certain words and phrases.      SCRIBE ATTESTATION NOTE:  Scribe was not using the documentation of this patient's care                          Sachin Felton MD  10/29/20 0808

## 2021-09-29 ENCOUNTER — TELEPHONE (OUTPATIENT)
Dept: ADMINISTRATIVE | Facility: OTHER | Age: 36
End: 2021-09-29

## 2021-10-13 ENCOUNTER — TELEPHONE (OUTPATIENT)
Dept: SPORTS MEDICINE | Facility: CLINIC | Age: 36
End: 2021-10-13

## 2022-08-07 ENCOUNTER — HOSPITAL ENCOUNTER (EMERGENCY)
Facility: HOSPITAL | Age: 37
Discharge: HOME | End: 2022-08-07
Attending: EMERGENCY MEDICINE
Payer: MEDICAID

## 2022-08-07 VITALS
HEIGHT: 70 IN | BODY MASS INDEX: 30.06 KG/M2 | DIASTOLIC BLOOD PRESSURE: 100 MMHG | SYSTOLIC BLOOD PRESSURE: 148 MMHG | HEART RATE: 80 BPM | RESPIRATION RATE: 19 BRPM | OXYGEN SATURATION: 100 % | TEMPERATURE: 98 F | WEIGHT: 210 LBS

## 2022-08-07 DIAGNOSIS — K08.89 PAIN, DENTAL: Primary | ICD-10-CM

## 2022-08-07 DIAGNOSIS — R22.0 FACIAL SWELLING: ICD-10-CM

## 2022-08-07 PROCEDURE — 25000003 PHARM REV CODE 250: Mod: ER | Performed by: EMERGENCY MEDICINE

## 2022-08-07 PROCEDURE — 99284 EMERGENCY DEPT VISIT MOD MDM: CPT | Mod: ER

## 2022-08-07 RX ORDER — OXYCODONE AND ACETAMINOPHEN 5; 325 MG/1; MG/1
1 TABLET ORAL EVERY 4 HOURS PRN
Qty: 18 TABLET | Refills: 0 | Status: SHIPPED | OUTPATIENT
Start: 2022-08-07

## 2022-08-07 RX ORDER — MELOXICAM 15 MG/1
15 TABLET ORAL DAILY
Qty: 30 TABLET | Refills: 0 | Status: SHIPPED | OUTPATIENT
Start: 2022-08-07 | End: 2023-06-05 | Stop reason: ALTCHOICE

## 2022-08-07 RX ORDER — CHLORHEXIDINE GLUCONATE ORAL RINSE 1.2 MG/ML
15 SOLUTION DENTAL 2 TIMES DAILY
Qty: 473 ML | Refills: 0 | Status: SHIPPED | OUTPATIENT
Start: 2022-08-07 | End: 2022-08-21

## 2022-08-07 RX ORDER — FAMOTIDINE 20 MG/1
20 TABLET, FILM COATED ORAL 2 TIMES DAILY
Qty: 60 TABLET | Refills: 0 | Status: SHIPPED | OUTPATIENT
Start: 2022-08-07 | End: 2023-08-07

## 2022-08-07 RX ORDER — IBUPROFEN 600 MG/1
600 TABLET ORAL
Status: COMPLETED | OUTPATIENT
Start: 2022-08-07 | End: 2022-08-07

## 2022-08-07 RX ORDER — LIDOCAINE HYDROCHLORIDE 20 MG/ML
15 SOLUTION OROPHARYNGEAL
Status: COMPLETED | OUTPATIENT
Start: 2022-08-07 | End: 2022-08-07

## 2022-08-07 RX ORDER — AMOXICILLIN AND CLAVULANATE POTASSIUM 875; 125 MG/1; MG/1
1 TABLET, FILM COATED ORAL
Status: COMPLETED | OUTPATIENT
Start: 2022-08-07 | End: 2022-08-07

## 2022-08-07 RX ADMIN — IBUPROFEN 600 MG: 600 TABLET ORAL at 11:08

## 2022-08-07 RX ADMIN — LIDOCAINE HYDROCHLORIDE 15 ML: 20 SOLUTION ORAL at 11:08

## 2022-08-07 RX ADMIN — AMOXICILLIN AND CLAVULANATE POTASSIUM 1 TABLET: 875; 125 TABLET, FILM COATED ORAL at 11:08

## 2022-08-07 NOTE — ED PROVIDER NOTES
Encounter Date: 8/7/2022       History     Chief Complaint   Patient presents with    Facial Swelling     Patient presents to ED c/o facial swelling, pt reports possible left oral abscess onset yesterday, worsening this morning   Denies sob, difficulty swallowing, chest pain, fever, chills      37 y.o. male. Past Medical History:  No date: Gout     Presents for evaluation of L maxillary premolar cracked tooth with gumline swelling. Denies f/c, n/v, diarrhea/dysuria.        Review of patient's allergies indicates:  No Known Allergies  Past Medical History:   Diagnosis Date    Gout      No past surgical history on file.  No family history on file.  Social History     Tobacco Use    Smoking status: Former Smoker     Types: Vaping with nicotine    Smokeless tobacco: Never Used    Tobacco comment: has not smoked in  8 years.    Substance Use Topics    Alcohol use: Yes     Comment: weekends     Drug use: Yes     Types: Marijuana     Comment: DAILY     Review of Systems   Constitutional: Negative for fever.   HENT: Negative for sore throat.    Respiratory: Negative for shortness of breath.    Cardiovascular: Negative for chest pain.   Gastrointestinal: Negative for nausea.   Genitourinary: Negative for dysuria.   Musculoskeletal: Negative for back pain.   Skin: Negative for rash.   Neurological: Negative for weakness.   Hematological: Does not bruise/bleed easily.   All other systems reviewed and are negative.      Physical Exam     Initial Vitals [08/07/22 1051]   BP Pulse Resp Temp SpO2   (S) (!) 148/100 80 19 98.4 °F (36.9 °C) 100 %      MAP       --         Physical Exam    Nursing note and vitals reviewed.  Constitutional: He appears well-developed and well-nourished.   HENT:   Head: Normocephalic and atraumatic.   Eyes: EOM are normal. Pupils are equal, round, and reactive to light.   Cardiovascular: Normal rate and regular rhythm.   Pulmonary/Chest: Effort normal.   Abdominal: He exhibits no distension.  "  Musculoskeletal:         General: No tenderness or edema.     Neurological: He is alert and oriented to person, place, and time.   Skin: Skin is warm and dry.   Psychiatric: He has a normal mood and affect.     L maxillary premolar is cracked/cavitary, there is gumline swelling  Above that tooth with a small area of fluctuance    ED Course   Procedures  Labs Reviewed - No data to display       Imaging Results    None          Medications   amoxicillin-clavulanate 875-125mg per tablet 1 tablet (has no administration in time range)   ibuprofen tablet 600 mg (has no administration in time range)   LIDOcaine HCl 2% oral solution 15 mL (has no administration in time range)                   Pt refuses needle decompression of abscess. States that he "wants to try the antibiotics". I have d/w him that there is a likelihood of failure and progression of his disease process however he still wants to try meds alone.       Clinical Impression:   Final diagnoses:  [K08.89] Pain, dental (Primary)  [R22.0] Facial swelling          ED Disposition Condition    Discharge Stable        ED Prescriptions     Medication Sig Dispense Start Date End Date Auth. Provider    meloxicam (MOBIC) 15 MG tablet Take 1 tablet (15 mg total) by mouth once daily. 30 tablet 8/7/2022  Nacho Herring MD    famotidine (PEPCID) 20 MG tablet Take 1 tablet (20 mg total) by mouth 2 (two) times daily. 60 tablet 8/7/2022 8/7/2023 Nacho Herring MD    oxyCODONE-acetaminophen (PERCOCET) 5-325 mg per tablet Take 1 tablet by mouth every 4 (four) hours as needed for Pain. 18 tablet 8/7/2022  Nacho Herring MD    chlorhexidine (PERIDEX) 0.12 % solution Use as directed 15 mLs in the mouth or throat 2 (two) times daily. for 14 days 473 mL 8/7/2022 8/21/2022 Nacho Herring MD        Follow-up Information     Follow up With Specialties Details Why Contact Info    [facility]    [address]  [address]  [place] [address]  [phone]      [facility], Oral and Maxillofacial Surgery, Oral Surgery   [address] [address]  [phone]             Nacho Herring MD  08/07/22 1975

## 2022-08-07 NOTE — DISCHARGE INSTRUCTIONS
Thank you for coming to our Emergency Department today. It is important to remember that some problems or medical conditions are difficult to diagnose and may not be found during your Emergency Department visit.     Be sure to follow up with your primary care doctor and review all labs/imaging/tests that were performed during your ER visit with them. Some labs/tests may be outside of the normal range and require non-emergent follow-up and further investigation to help diagnose/exclude/prevent complications or other potentially serious medical conditions that were not addressed during your ER visit.    If you do not have a primary care doctor, you may contact the one listed on your discharge paperwork or you may also call the [facility] Appointment Desk at [phone] to schedule an appointment and establish care with one. It is important to your health that you have a primary care doctor.    Please take all medications as directed. All medications may potentially have side-effects and it is impossible to predict which medications may give you side-effects or what side-effects (if any) they will give you.. If you feel that you are having a negative effect or side-effect of any medication you should immediately stop taking them and seek medical attention. If you feel that you are having a life-threatening reaction call 911.    Return to the ER with any questions/concerns, new/concerning symptoms, worsening or failure to improve.     Do not drive, swim, climb to height, take a bath, operate heavy machinery, drink alcohol or take potentially sedating medications, sign any legal documents or make any important decisions for 24 hours if you have received any pain medications, sedatives or mood altering drugs during your ER visit or within 24 hours of taking them if they have been prescribed to you.     You can find additional resources for Dentists, hearing aids, durable medical equipment, low cost pharmacies and  other resources at https://[facility].org    BELOW THIS LINE ONLY APPLIES IF YOU HAVE A COVID TEST PENDING OR IF YOU HAVE BEEN DIAGNOSED WITH COVID:  Please access [facility] to review the results of your test. Until the results of your COVID test return, you should isolate yourself so as not to potentially spread illness to others.   If your COVID test returns positive, you should isolate yourself so as not to spread illness to others. After five full days, if you are feeling better and you have not had fever for 24 hours, you can return to your typical daily activities, but you must wear a mask around others for an additional 5 days.   If your COVID test returns negative and you are either unvaccinated or more than six months out from your two-dose vaccine and are not yet boosted, you should still quarantine for 5 full days followed by strict mask use for an additional 5 full days.   If your COVID test returns negative and you have received your 2-dose initial vaccine as well as a booster, you should continue strict mask use for 10 full days after the exposure.  For all those exposed, best practice includes a test at day 5 after the exposure. This can be a home test or a test through one of the many testing centers throughout our community.   Masking is always advised to limit the spread of COVID. Cdc.gov is an excellent site to obtain the latest up to date recommendations regarding COVID and COVID testing.     CDC Testing and Quarantine Guidelines for patients with exposure to a known-positive COVID-19 person:  A close exposure is defined as anyone who has had an exposure (masked or unmasked) to a known COVID -19 positive person within 6 feet of someone for a cumulative total of 15 minutes or more over a 24-hour period.   Vaccinated and/or if you recently had a positive covid test within 90 days do NOT need to quarantine after contact with someone who had COVID-19 unless you develop symptoms.   Fully vaccinated  people who have not had a positive test within 90 days, should get tested 3-5 days after their exposure, even if they don't have symptoms and wear a mask indoors in public for 14 days following exposure or until their test result is negative.      Unvaccinated and/or NOT had a positive test within 90 days and meet close exposure  You are required by CDC guidelines to quarantine for at least 5 days from time of exposure followed by 5 days of strict masking. It is recommended, but not required to test after 5 days, unless you develop symptoms, in which case you should test at that time.  If you get tested after 5 days and your test is positive, your 5 day period of isolation starts the day of the positive test.    If your exposure does not meet the above definition, you can return to your normal daily activities to include social distancing, wearing a mask and frequent handwashing.      Here is a link to guidance from the CDC:  https://www.cdc.gov/media/releases/2021/s1227-isolation-quarantine-guidance.html      [place] Dept Of Health Testing Sites:  https://ldh.la.gov/page/3934      [facility] website with testing locations and guidance:  [email or web address]

## 2022-08-08 ENCOUNTER — HOSPITAL ENCOUNTER (EMERGENCY)
Facility: HOSPITAL | Age: 37
Discharge: HOME | End: 2022-08-08
Attending: EMERGENCY MEDICINE
Payer: MEDICAID

## 2022-08-08 VITALS
RESPIRATION RATE: 16 BRPM | WEIGHT: 210 LBS | BODY MASS INDEX: 30.13 KG/M2 | TEMPERATURE: 98 F | OXYGEN SATURATION: 99 % | HEART RATE: 73 BPM | SYSTOLIC BLOOD PRESSURE: 163 MMHG | DIASTOLIC BLOOD PRESSURE: 96 MMHG

## 2022-08-08 DIAGNOSIS — K04.7 DENTAL ABSCESS: Primary | ICD-10-CM

## 2022-08-08 PROCEDURE — 40800 DRAINAGE OF MOUTH LESION: CPT | Mod: ER

## 2022-08-08 PROCEDURE — 99284 EMERGENCY DEPT VISIT MOD MDM: CPT | Mod: 25,ER

## 2022-08-08 PROCEDURE — 25000003 PHARM REV CODE 250: Mod: ER | Performed by: NURSE PRACTITIONER

## 2022-08-08 RX ORDER — LIDOCAINE HYDROCHLORIDE 10 MG/ML
5 INJECTION INFILTRATION; PERINEURAL
Status: COMPLETED | OUTPATIENT
Start: 2022-08-08 | End: 2022-08-08

## 2022-08-08 RX ADMIN — LIDOCAINE HYDROCHLORIDE 5 ML: 10 INJECTION, SOLUTION INFILTRATION; PERINEURAL at 03:08

## 2022-08-08 NOTE — ED PROVIDER NOTES
"Encounter Date: 8/8/2022    SCRIBE #1 NOTE: I, Brenna Grewal, am scribing for, and in the presence of,  Kar Martino DNP. I have scribed the following portions of the note - Other sections scribed: HPI, ROS, PE.       History     Chief Complaint   Patient presents with    Dental Pain     Pt states," I was here yesterday with a bad tooth. The tooth still hurts."     37 y.o. male who presents to the ER for acute left upper molar dental pain with abscess worsening for 2 days. Patient was seen here yesterday, refused drainage of abscess, was discharged on Mobic and Percocet which he has been compliant with, and given amoxicillin in the ER. Patient denies fever. No further complaints at this time. Pt is requesting the incision and drainage he declined on last visit.    The history is provided by the patient. No  was used.     Review of patient's allergies indicates:  No Known Allergies  Past Medical History:   Diagnosis Date    Gout      No past surgical history on file.  No family history on file.  Social History     Tobacco Use    Smoking status: Former Smoker     Types: Vaping with nicotine    Smokeless tobacco: Never Used    Tobacco comment: has not smoked in  8 years.    Substance Use Topics    Alcohol use: Yes     Comment: weekends     Drug use: Yes     Types: Marijuana     Comment: DAILY     Review of Systems   Constitutional: Negative for chills, fatigue and fever.   HENT: Positive for dental problem. Negative for congestion, ear discharge, ear pain, postnasal drip, rhinorrhea, sinus pressure, sneezing, sore throat and voice change.    Eyes: Negative for discharge and itching.   Respiratory: Negative for cough, shortness of breath and wheezing.    Cardiovascular: Negative for chest pain, palpitations and leg swelling.   Gastrointestinal: Negative for abdominal pain, constipation, diarrhea, nausea and vomiting.   Endocrine: Negative for polydipsia, polyphagia and polyuria. "   Genitourinary: Negative for dysuria, frequency, hematuria and urgency.   Musculoskeletal: Negative for arthralgias and myalgias.   Skin: Negative for rash and wound.   Neurological: Negative for dizziness, seizures, syncope, weakness and numbness.   Hematological: Negative for adenopathy. Does not bruise/bleed easily.   Psychiatric/Behavioral: Negative for self-injury and suicidal ideas. The patient is not nervous/anxious.        Physical Exam     Initial Vitals [08/08/22 1514]   BP Pulse Resp Temp SpO2   (!) 163/96 73 16 98.4 °F (36.9 °C) 99 %      MAP       --         Physical Exam    Nursing note and vitals reviewed.  Constitutional: He appears well-developed and well-nourished. He is not diaphoretic. No distress.   HENT:   Head: Normocephalic and atraumatic.   Right Ear: External ear normal.   Left Ear: External ear normal.   Nose: Nose normal.   Left upper 1st molar is cracked. Area of drainable purulence on the external gingival surface.   Eyes: Pupils are equal, round, and reactive to light. Right eye exhibits no discharge. Left eye exhibits no discharge. No scleral icterus.   Neck:   Normal range of motion.  Pulmonary/Chest: No respiratory distress.   Abdominal: He exhibits no distension.   Musculoskeletal:         General: Normal range of motion.      Cervical back: Normal range of motion.     Neurological: He is alert and oriented to person, place, and time.   Skin: Skin is dry. Capillary refill takes less than 2 seconds.         ED Course   I & D - Incision and Drainage    Date/Time: 8/8/2022 4:56 PM  Location procedure was performed: [facility] EMERGENCY DEPARTMENT  Performed by: Kar Martino DNP  Authorized by: Dolly Flynn MD   Pre-operative diagnosis: dental abscess  Post-operative diagnosis: dental abscess  Type: abscess  Body area: mouth  Location details: vestibule of mouth  Anesthesia: local infiltration (pt interrupted anesthesia placement by moving the hand of the provider forcefully.   Procedure aborted at this time.)        Labs Reviewed - No data to display       Imaging Results    None          Medications   LIDOcaine HCL 10 mg/ml (1%) injection 5 mL (5 mLs Infiltration Given 8/8/22 1630)     Medical Decision Making:   History:   Old Medical Records: I decided to obtain old medical records.  Initial Assessment:   37 y.o. male who presents to the ER for acute left upper molar dental pain with abscess worsening for 2 days. Patient was seen here yesterday, refused drainage of abscess, was discharged on Mobic and Percocet which he has been compliant with, and given amoxicillin in the ER. On exam, there is a drainable area of purulence to the external gingival surface and the left upper 1st molar is cracked. Patient given Lidocaine in the ER and drainage of abscess attempted.  Differential Diagnosis:   Includes but is not limited to: Abscess, Cracked tooh  As the patient was unable to tolerate the incision and drainage of his abscess, he was afebrile and nontoxic in no apparent distress.  I felt it was safe to discharge him to follow-up with dental resources provided and continue antibiotic therapy that had been initiated.  I felt that the risk of attempting to incise and drain a dental abscess on a pt who was unable to remain still during the procedure caused the risk of the procedure to outweigh the benefit of completing it successfully.  The patient stated that he would follow up as directed.          Scribe Attestation:   Scribe #1: I performed the above scribed service and the documentation accurately describes the services I performed. I attest to the accuracy of the note.               Scribe attestation: Scribe attestation: I, Kar Martino, GRACE ACNP-BC FNP-C ENP-C,  personally performed the services described in this documentation. All medical record entries made by the scribe were at my direction and in my presence.  I have reviewed the chart and agree that the record reflects my  personal performance and is accurate and complete.   Clinical Impression:   Final diagnoses:  [K04.7] Dental abscess (Primary)          ED Disposition Condition    Discharge Stable       See AVS for additional recommendations. Medications listed herein were prescribed after reviewing the patient's allergies, medication list, history, most recent laboratories as available.  Referrals below were provided after reviewing the patient's previous medical providers. He understands he  should return for any worsening or changes in condition.  Prior to discharge the patient was asked if he  had any additional concerns or complaints and he declined. The patient was given an opportunity to ask questions and all were answered to his satisfaction.  ED Prescriptions     None        Follow-up Information     Follow up With Specialties Details Why Contact Info    Dentistry resource from sheet provided  Schedule an appointment as soon as possible for a visit              Kar Martino, GRACE  08/08/22 2826

## 2022-08-08 NOTE — DISCHARGE INSTRUCTIONS
Take meds as prescribed yesterday.  Return to the Emergency Department for any worsening, change in condition, or any emergent concerns.

## 2023-05-07 ENCOUNTER — HOSPITAL ENCOUNTER (EMERGENCY)
Facility: HOSPITAL | Age: 38
Discharge: HOME | End: 2023-05-07
Attending: EMERGENCY MEDICINE
Payer: MEDICAID

## 2023-05-07 VITALS
WEIGHT: 220 LBS | RESPIRATION RATE: 15 BRPM | BODY MASS INDEX: 32.58 KG/M2 | SYSTOLIC BLOOD PRESSURE: 160 MMHG | OXYGEN SATURATION: 99 % | HEART RATE: 99 BPM | HEIGHT: 69 IN | DIASTOLIC BLOOD PRESSURE: 97 MMHG | TEMPERATURE: 100 F

## 2023-05-07 DIAGNOSIS — H66.003 NON-RECURRENT ACUTE SUPPURATIVE OTITIS MEDIA OF BOTH EARS WITHOUT SPONTANEOUS RUPTURE OF TYMPANIC MEMBRANES: Primary | ICD-10-CM

## 2023-05-07 DIAGNOSIS — R05.1 ACUTE COUGH: ICD-10-CM

## 2023-05-07 LAB
CTP QC/QA: YES
INFLUENZA A ANTIGEN, POC: NEGATIVE
INFLUENZA B ANTIGEN, POC: NEGATIVE
POC RAPID STREP A: NEGATIVE
SARS-COV-2 RDRP RESP QL NAA+PROBE: NEGATIVE

## 2023-05-07 PROCEDURE — 87804 INFLUENZA ASSAY W/OPTIC: CPT | Mod: ER

## 2023-05-07 PROCEDURE — 25000003 PHARM REV CODE 250: Mod: ER

## 2023-05-07 PROCEDURE — 99284 EMERGENCY DEPT VISIT MOD MDM: CPT | Mod: ER

## 2023-05-07 RX ORDER — AMOXICILLIN AND CLAVULANATE POTASSIUM 875; 125 MG/1; MG/1
1 TABLET, FILM COATED ORAL
Status: COMPLETED | OUTPATIENT
Start: 2023-05-07 | End: 2023-05-07

## 2023-05-07 RX ORDER — PSEUDOEPHEDRINE HCL 120 MG/1
120 TABLET, FILM COATED, EXTENDED RELEASE ORAL 2 TIMES DAILY PRN
Qty: 14 TABLET | Refills: 0 | Status: SHIPPED | OUTPATIENT
Start: 2023-05-07 | End: 2023-05-14

## 2023-05-07 RX ORDER — GUAIFENESIN/DEXTROMETHORPHAN 100-10MG/5
5 SYRUP ORAL EVERY 6 HOURS PRN
Qty: 473 ML | Refills: 0 | Status: SHIPPED | OUTPATIENT
Start: 2023-05-07 | End: 2023-05-17

## 2023-05-07 RX ORDER — AMOXICILLIN AND CLAVULANATE POTASSIUM 875; 125 MG/1; MG/1
1 TABLET, FILM COATED ORAL 2 TIMES DAILY
Qty: 20 TABLET | Refills: 0 | Status: SHIPPED | OUTPATIENT
Start: 2023-05-07 | End: 2023-05-17

## 2023-05-07 RX ORDER — IBUPROFEN 600 MG/1
600 TABLET ORAL EVERY 6 HOURS PRN
Qty: 20 TABLET | Refills: 0 | Status: SHIPPED | OUTPATIENT
Start: 2023-05-07

## 2023-05-07 RX ORDER — IBUPROFEN 400 MG/1
800 TABLET ORAL
Status: COMPLETED | OUTPATIENT
Start: 2023-05-07 | End: 2023-05-07

## 2023-05-07 RX ORDER — ACETAMINOPHEN 500 MG
1000 TABLET ORAL EVERY 6 HOURS PRN
Qty: 56 TABLET | Refills: 0 | Status: SHIPPED | OUTPATIENT
Start: 2023-05-07 | End: 2023-05-14

## 2023-05-07 RX ORDER — BENZONATATE 200 MG/1
200 CAPSULE ORAL 3 TIMES DAILY PRN
Qty: 30 CAPSULE | Refills: 0 | Status: SHIPPED | OUTPATIENT
Start: 2023-05-07 | End: 2023-05-17

## 2023-05-07 RX ADMIN — IBUPROFEN 800 MG: 400 TABLET ORAL at 08:05

## 2023-05-07 RX ADMIN — AMOXICILLIN AND CLAVULANATE POTASSIUM 1 TABLET: 875; 125 TABLET, FILM COATED ORAL at 08:05

## 2023-05-08 NOTE — DISCHARGE INSTRUCTIONS

## 2023-05-08 NOTE — ED PROVIDER NOTES
Encounter Date: 5/7/2023       History     Chief Complaint   Patient presents with    General Illness     Runny nose, productive cough, bodyaches, and sore throat since yesterday.      Juan Johnson Jr.   Is a 38-year-old male with no pertinent past medical history who presents to the emergency department with a chief complaint of upper respiratory symptoms.  He was in his normal state of health until 3 days ago when he spent time with his nephew, who had a runny nose.  Two days ago, he woke up in the morning and had a runny nose.  Since then, he has had development of rhinorrhea, congestion, cough, headache, and subjective fever.  He attempted numerous over-the-counter medications with minimal relief of his symptoms.  He denies any dyspnea, shortness of breath, chest pain, nausea, vomiting, or syncope.    The history is provided by the patient. No  was used.   Review of patient's allergies indicates:  No Known Allergies  Past Medical History:   Diagnosis Date    Gout      History reviewed. No pertinent surgical history.  History reviewed. No pertinent family history.  Social History     Tobacco Use    Smoking status: Former     Types: Vaping with nicotine    Smokeless tobacco: Never    Tobacco comments:     has not smoked in  8 years.    Substance Use Topics    Alcohol use: Yes     Comment: weekends     Drug use: Yes     Types: Marijuana     Comment: DAILY     Review of Systems   Constitutional:  Positive for fever (Subjective). Negative for chills.   HENT:  Positive for rhinorrhea, sinus pressure and sore throat. Negative for ear discharge and ear pain.    Respiratory:  Negative for shortness of breath.    Cardiovascular:  Negative for chest pain.   Gastrointestinal:  Negative for nausea.   Genitourinary:  Negative for dysuria.   Musculoskeletal:  Negative for back pain.   Skin:  Negative for rash.   Neurological:  Positive for headaches. Negative for seizures, syncope and weakness.    Hematological:  Does not bruise/bleed easily.     Physical Exam     Initial Vitals [05/07/23 1929]   BP Pulse Resp Temp SpO2   (!) 160/97 (!) 112 18 99.6 °F (37.6 °C) 100 %      MAP       --         Physical Exam    Nursing note and vitals reviewed.  Constitutional: Vital signs are normal. He appears well-developed and well-nourished. He is cooperative. He does not appear ill. No distress.   HENT:   Head: Normocephalic and atraumatic.   Right Ear: Hearing and external ear normal. Tympanic membrane is injected and erythematous. A middle ear effusion is present.   Left Ear: Hearing and external ear normal. Tympanic membrane is injected and erythematous. A middle ear effusion is present.   Nose: Nose normal.   Mouth/Throat: Mucous membranes are normal. Oropharyngeal exudate, posterior oropharyngeal edema and posterior oropharyngeal erythema present.    Very minimal edema of the tonsils and posterior oropharynx, tonsils are 1+.  They are erythematous.  Scant amount of exudate on the right tonsil.  Bilateral tympanic membranes were erythematous and injected and had suppurative mid ear effusion.   Right is worse than left.   Eyes: Conjunctivae and EOM are normal.   Neck: Phonation normal. Neck supple. No stridor present.    Full passive range of motion without pain.     Cardiovascular:  Normal rate, regular rhythm, S1 normal, S2 normal and normal heart sounds.  No extrasystoles are present.          No murmur heard.  Pulmonary/Chest: Effort normal and breath sounds normal. No accessory muscle usage. No tachypnea. No respiratory distress. He exhibits no tenderness.     Respirations even and unlabored.  No acute respiratory distress.  Good air movement, no adventitious sounds of breathing.  Patient coughed multiple times during my exam.   Abdominal: Abdomen is soft and flat. Bowel sounds are normal. There is no abdominal tenderness.   Musculoskeletal:      Cervical back: Full passive range of motion without pain and neck  supple. No spinous process tenderness. Normal range of motion.     Neurological: He is alert and oriented to person, place, and time. GCS eye subscore is 4. GCS verbal subscore is 5. GCS motor subscore is 6.   Skin: Skin is warm and dry. Capillary refill takes less than 2 seconds. No rash noted. No pallor.       ED Course   Procedures  Labs Reviewed   SARS-COV-2 RDRP GENE    Narrative:     This test utilizes isothermal nucleic acid amplification technology to detect the SARS-CoV-2 RdRp nucleic acid segment. The analytical sensitivity (limit of detection) is 500 copies/swab.     A POSITIVE result is indicative of the presence of SARS-CoV-2 RNA; clinical correlation with patient history and other diagnostic information is necessary to determine patient infection status.    A NEGATIVE result means that SARS-CoV-2 nucleic acids are not present above the limit of detection. A NEGATIVE result should be treated as presumptive. It does not rule out the possibility of COVID-19 and should not be the sole basis for treatment decisions. If COVID-19 is strongly suspected based on clinical and exposure history, re-testing using an alternate molecular assay should be considered.     This test is only for use under the Food and Drug Administration s Emergency Use Authorization (EUA).     Commercial kits are provided by [employer]. Performance characteristics of the EUA have been independently verified by [facility] Department of Pathology and Laboratory Medicine.   _________________________________________________________________   The authorized Fact Sheet for Healthcare Providers and the authorized Fact Sheet for Patients of the ID NOW COVID-19 are available on the FDA website:    https://www.fda.gov/media/510293/download      https://www.fda.gov/media/177033/download       POCT INFLUENZA A/B MOLECULAR   POCT STREP A MOLECULAR   POCT STREP A, RAPID   POCT RAPID INFLUENZA A/B          Imaging Results    None           Medications   ibuprofen tablet 800 mg (800 mg Oral Given 5/7/23 2052)   amoxicillin-clavulanate 875-125mg per tablet 1 tablet (1 tablet Oral Given 5/7/23 2053)     Medical Decision Making:   Initial Assessment:     38-year-old male presenting to the emergency department with a chief complaint of upper respiratory symptoms.  Known sick contact.  He has had productive cough, sinus congestion, headache, and rhinorrhea.  On physical exam, he appears ill.  Bilateral tympanic membranes erythematous and injected with suppurative middle ear effusion.  Scant exudates on tonsils.  Good air movement, cardiac and lung exams within normal limits.  Differential Diagnosis:   Differential diagnosis includes but is not limited to respiratory infections including   Strep pharyngitis, viral pharyngitis, otitis media, otitis externa, bacterial rhinosinusitis, COVID, flu, bronchitis, rhinosinusitis, or pneumonia, or noninfectious processes such as asthma, COPD or seasonal allergies.   Clinical Tests:   Lab Tests: Ordered and Reviewed       <> Summary of Lab:  Strep, COVID, flu negative.  ED Management:    Patient presenting to the emergency department with upper respiratory symptoms.  History and physical exam findings as above.  Presentation is consistent with acute suppurative otitis media of both ears, possible that the patient has acute bacterial rhinosinusitis and postnasal drip is causing his cough.  In any event, will treat as bacterial infection with a 10 day course of Augmentin to cover both strep and other common pathogens in the upper respiratory tract.  Motrin, Tylenol, and numerous other symptomatic medications were electronically prescribed and sent to the patient's preferred pharmacy.    Return precautions were discussed, all patient questions were answered, and the patient was agreeable to the plan of care.  He was discharged home in stable condition and will follow up with his primary care provider or return to  the emergency department if his symptoms worsen or do not improve.                         Clinical Impression:   Final diagnoses:  [H66.003] Non-recurrent acute suppurative otitis media of both ears without spontaneous rupture of tympanic membranes (Primary)  [R05.1] Acute cough        ED Disposition Condition    Discharge Stable          ED Prescriptions       Medication Sig Dispense Start Date End Date Auth. Provider    benzocaine-menthoL 6-10 mg lozenge Take 1 lozenge by mouth every 2 (two) hours as needed. 36 tablet 5/7/2023 -- Davion Schultz PA-C    dextromethorphan-guaiFENesin  mg/5 ml (ROBITUSSIN-DM)  mg/5 mL liquid Take 5 mLs by mouth every 6 (six) hours as needed (cough). 473 mL 5/7/2023 5/17/2023 Davion Schultz PA-C    benzonatate (TESSALON) 200 MG capsule Take 1 capsule (200 mg total) by mouth 3 (three) times daily as needed for Cough. 30 capsule 5/7/2023 5/17/2023 Davion Schultz PA-C    ibuprofen (ADVIL,MOTRIN) 600 MG tablet Take 1 tablet (600 mg total) by mouth every 6 (six) hours as needed for Pain. 20 tablet 5/7/2023 -- Davion Schultz PA-C    acetaminophen (TYLENOL) 500 MG tablet Take 2 tablets (1,000 mg total) by mouth every 6 (six) hours as needed for Pain. 56 tablet 5/7/2023 5/14/2023 Davion Schultz PA-C    amoxicillin-clavulanate 875-125mg (AUGMENTIN) 875-125 mg per tablet Take 1 tablet by mouth 2 (two) times daily. for 10 days 20 tablet 5/7/2023 5/17/2023 Davion Schultz PA-C    pseudoephedrine (SUDAFED 12 HOUR) 120 mg 12 hr tablet Take 1 tablet (120 mg total) by mouth 2 (two) times daily as needed for Congestion. 14 tablet 5/7/2023 5/14/2023 Davion Schultz PA-C          Follow-up Information       Follow up With Specialties Details Why Contact Info    [facility]iffany  Alie  Schedule an appointment as soon as possible for a visit  As needed, If symptoms worsen [address] [place] [address]  [phone]      [facility] Emergency Medicine  Go to  If symptoms worsen [address]  [facility] [address]  [phone]             Davion Schultz PA-C  05/07/23 3296

## 2023-06-05 ENCOUNTER — HOSPITAL ENCOUNTER (EMERGENCY)
Facility: HOSPITAL | Age: 38
Discharge: HOME | End: 2023-06-06
Attending: EMERGENCY MEDICINE
Payer: MEDICAID

## 2023-06-05 DIAGNOSIS — L02.612 ABSCESS OF LEFT FOOT: Primary | ICD-10-CM

## 2023-06-05 PROCEDURE — 63600175 PHARM REV CODE 636 W HCPCS: Mod: ER | Performed by: EMERGENCY MEDICINE

## 2023-06-05 PROCEDURE — 10060 I&D ABSCESS SIMPLE/SINGLE: CPT | Mod: ER

## 2023-06-05 PROCEDURE — 90471 IMMUNIZATION ADMIN: CPT | Mod: ER | Performed by: EMERGENCY MEDICINE

## 2023-06-05 PROCEDURE — 99284 EMERGENCY DEPT VISIT MOD MDM: CPT | Mod: ER

## 2023-06-05 PROCEDURE — 25000003 PHARM REV CODE 250: Mod: ER | Performed by: EMERGENCY MEDICINE

## 2023-06-05 PROCEDURE — 87070 CULTURE OTHR SPECIMN AEROBIC: CPT | Performed by: EMERGENCY MEDICINE

## 2023-06-05 PROCEDURE — 90715 TDAP VACCINE 7 YRS/> IM: CPT | Mod: ER | Performed by: EMERGENCY MEDICINE

## 2023-06-05 PROCEDURE — 87077 CULTURE AEROBIC IDENTIFY: CPT | Performed by: EMERGENCY MEDICINE

## 2023-06-05 PROCEDURE — 87186 SC STD MICRODIL/AGAR DIL: CPT | Performed by: EMERGENCY MEDICINE

## 2023-06-05 RX ORDER — MUPIROCIN 20 MG/G
OINTMENT TOPICAL 2 TIMES DAILY
Qty: 22 G | Refills: 0 | Status: SHIPPED | OUTPATIENT
Start: 2023-06-05 | End: 2023-06-15

## 2023-06-05 RX ORDER — SULFAMETHOXAZOLE AND TRIMETHOPRIM 800; 160 MG/1; MG/1
1 TABLET ORAL 2 TIMES DAILY
Qty: 20 TABLET | Refills: 0 | Status: SHIPPED | OUTPATIENT
Start: 2023-06-05 | End: 2023-06-15

## 2023-06-05 RX ORDER — AMOXICILLIN AND CLAVULANATE POTASSIUM 875; 125 MG/1; MG/1
1 TABLET, FILM COATED ORAL 2 TIMES DAILY
Qty: 20 TABLET | Refills: 0 | Status: SHIPPED | OUTPATIENT
Start: 2023-06-05 | End: 2023-06-15

## 2023-06-05 RX ORDER — NAPROXEN 500 MG/1
500 TABLET ORAL 2 TIMES DAILY WITH MEALS
Qty: 20 TABLET | Refills: 0 | Status: SHIPPED | OUTPATIENT
Start: 2023-06-05 | End: 2023-06-15

## 2023-06-05 RX ORDER — LIDOCAINE HYDROCHLORIDE 10 MG/ML
10 INJECTION INFILTRATION; PERINEURAL
Status: COMPLETED | OUTPATIENT
Start: 2023-06-05 | End: 2023-06-05

## 2023-06-05 RX ADMIN — TETANUS TOXOID, REDUCED DIPHTHERIA TOXOID AND ACELLULAR PERTUSSIS VACCINE, ADSORBED 0.5 ML: 5; 2.5; 8; 8; 2.5 SUSPENSION INTRAMUSCULAR at 11:06

## 2023-06-05 RX ADMIN — BACITRACIN ZINC, NEOMYCIN, POLYMYXIN B 1 EACH: 400; 3.5; 5 OINTMENT TOPICAL at 11:06

## 2023-06-05 RX ADMIN — LIDOCAINE HYDROCHLORIDE 10 ML: 10 INJECTION, SOLUTION INFILTRATION; PERINEURAL at 11:06

## 2023-06-06 VITALS
HEIGHT: 69 IN | WEIGHT: 210 LBS | RESPIRATION RATE: 18 BRPM | DIASTOLIC BLOOD PRESSURE: 98 MMHG | TEMPERATURE: 98 F | SYSTOLIC BLOOD PRESSURE: 152 MMHG | BODY MASS INDEX: 31.1 KG/M2 | HEART RATE: 98 BPM | OXYGEN SATURATION: 98 %

## 2023-06-06 NOTE — ED TRIAGE NOTES
"Juan Johnson Jr., a 38 y.o. male presents to the ED w/ complaint of a painful lump on the bottom of his left foot proximal to the great toe.  He thought it might be a cyst and has pushed on it trying to get pus out of it.  He reports he has been working on his feet and has made it bigger and more painful.      Triage note:  Chief Complaint   Patient presents with    Foot Injury     Pt reports left foot pain with large cyst-like bulge on bottom of foot. Pt attempted to "pop" it a few days ago without relief, worsening pain in the area.      Review of patient's allergies indicates:  No Known Allergies  Past Medical History:   Diagnosis Date    Gout       "

## 2023-06-06 NOTE — ED PROVIDER NOTES
"Encounter Date: 6/5/2023       History     Chief Complaint   Patient presents with    Foot Injury     Pt reports left foot pain with large cyst-like bulge on bottom of foot. Pt attempted to "pop" it a few days ago without relief, worsening pain in the area.      38 y.o. male with hypertension, gout, vape user presents emergency department complaining of acute pain and swelling to the plantar aspect of left foot that began about a week ago.  Patient reports switching to a different pair of tennis shoes for work prior to the onset of symptoms.  He describes symptoms beginning as a small callus that became progressively more swollen and painful over last four days after he attempted to poke the area with a safety pin.  He reports drainage of a small amount of pus from the area four days ago.  Denies weakness, numbness, fever, rash, joint swelling or gait disturbance.  Last tetanus unknown    The history is provided by the patient.   Review of patient's allergies indicates:  No Known Allergies  Past Medical History:   Diagnosis Date    Gout      No past surgical history on file.  No family history on file.  Social History     Tobacco Use    Smoking status: Former     Types: Vaping with nicotine    Smokeless tobacco: Never    Tobacco comments:     has not smoked in  8 years.    Substance Use Topics    Alcohol use: Yes     Comment: weekends     Drug use: Yes     Types: Marijuana     Comment: DAILY     Review of Systems   Constitutional:  Negative for fever.   Cardiovascular:  Negative for leg swelling.   Musculoskeletal:  Negative for arthralgias, gait problem and joint swelling.   Skin:  Positive for wound. Negative for rash.   Neurological:  Negative for weakness and numbness.   All other systems reviewed and are negative.    Physical Exam     Initial Vitals [06/05/23 2154]   BP Pulse Resp Temp SpO2   (!) 157/104 100 18 98.2 °F (36.8 °C) 97 %      MAP       --         Physical Exam    Nursing note and vitals " reviewed.  Constitutional: He appears well-developed and well-nourished. He is not diaphoretic. No distress.   HENT:   Head: Normocephalic and atraumatic.   Mouth/Throat: Oropharynx is clear and moist.   Eyes: Conjunctivae are normal.   Neck: Phonation normal. No stridor present.   Normal range of motion.  Cardiovascular:  Regular rhythm and intact distal pulses.           Pulmonary/Chest: Effort normal. No accessory muscle usage or stridor. No tachypnea. No respiratory distress.   Abdominal: He exhibits no distension. There is no abdominal tenderness.   Musculoskeletal:         General: No tenderness. Normal range of motion.      Cervical back: Normal range of motion.      Left foot: Normal range of motion and normal capillary refill. Swelling present. No laceration, bony tenderness or crepitus. Normal pulse.        Feet:      Neurological: He is alert and oriented to person, place, and time. He has normal strength. Gait normal. GCS eye subscore is 4. GCS verbal subscore is 5. GCS motor subscore is 6.   Skin: Skin is warm and intact. Abscess noted. There is erythema.   Psychiatric: He has a normal mood and affect.       ED Course   I & D - Incision and Drainage    Date/Time: 2023 11:47 PM  Location procedure was performed: [facility] EMERGENCY DEPARTMENT  Performed by: Mark Hills MD  Authorized by: Mark Hills MD   Consent Done: Yes  Consent: Verbal consent obtained.  Risks and benefits: risks, benefits and alternatives were discussed  Consent given by: patient  Patient understanding: patient states understanding of the procedure being performed  Patient identity confirmed:  and name  Type: abscess  Body area: lower extremity  Location details: left foot  Anesthesia: local infiltration    Anesthesia:  Local Anesthetic: lidocaine 1% without epinephrine  Anesthetic total: 4 mL  Risk factor: underlying major nerve  Scalpel size: 11  Incision type: single straight  Incision depth: dermal  Complexity:  simple  Drainage: pus, purulent and bloody  Drainage amount: moderate  Wound treatment: incision, drainage, expression of material, deloculation and wound left open  Packing material: none  Complications: No  Specimens: Yes (wound culture)  Patient tolerance: Patient tolerated the procedure well with no immediate complications  Comments: Neurovascular status intact before and after procedure done.    Incision depth: dermal      Labs Reviewed   CULTURE, AEROBIC  (SPECIFY SOURCE) - Abnormal; Notable for the following components:       Result Value    Aerobic Bacterial Culture   (*)     Value: STAPHYLOCOCCUS LUGDUNENSIS  Many      All other components within normal limits          Imaging Results    None          Medications   LIDOcaine HCL 10 mg/ml (1%) injection 10 mL (10 mLs Intradermal Given 6/5/23 2302)   neomycin-bacitracnZn-polymyxnB packet (1 each Topical (Top) Given 6/5/23 2313)   Tdap (BOOSTRIX) vaccine injection 0.5 mL (0.5 mLs Intramuscular Given 6/5/23 2303)                          Labs Reviewed  Admission on 06/05/2023, Discharged on 06/06/2023   Component Date Value Ref Range Status    Aerobic Bacterial Culture 06/05/2023  (A)   Final                    Value:STAPHYLOCOCCUS LUGDUNENSIS  Many          Imaging Reviewed    Imaging Results    None         Medications given in ED    Medications   LIDOcaine HCL 10 mg/ml (1%) injection 10 mL (10 mLs Intradermal Given 6/5/23 2302)   neomycin-bacitracnZn-polymyxnB packet (1 each Topical (Top) Given 6/5/23 2313)   Tdap (BOOSTRIX) vaccine injection 0.5 mL (0.5 mLs Intramuscular Given 6/5/23 2303)       Note was created using voice recognition software. Note may have occasional typographical errors that may not have been identified and edited despite good mike initial review prior to signing.      Clinical Impression:   Final diagnoses:  [L02.612] Abscess of left foot (Primary)        ED Disposition Condition    Discharge Stable          ED Prescriptions        Medication Sig Dispense Start Date End Date Auth. Provider    mupirocin (BACTROBAN) 2 % ointment () Apply topically 2 (two) times daily. for 10 days 22 g 2023 6/15/2023 Mark Hills MD    amoxicillin-clavulanate 875-125mg (AUGMENTIN) 875-125 mg per tablet () Take 1 tablet by mouth 2 (two) times daily. for 10 days 20 tablet 2023 6/15/2023 Mark Hills MD    sulfamethoxazole-trimethoprim 800-160mg (BACTRIM DS) 800-160 mg Tab () Take 1 tablet by mouth 2 (two) times daily. for 10 days 20 tablet 2023 6/15/2023 Mark Hills MD    naproxen (NAPROSYN) 500 MG tablet () Take 1 tablet (500 mg total) by mouth 2 (two) times daily with meals. for 10 days 20 tablet 2023 6/15/2023 Mark Hills MD          Follow-up Information       Follow up With Specialties Details Why Contact Info    The nearest emergency department.  Go to  As needed, If symptoms worsen     Your PCP  Call in 1 day to schedule an appointment, for re-evaluation of today's complaint, and ongoing care     [facility] Emergency Medicine In 2 days For wound re-check [address] [facility] [address] [phone]             Mark Hills MD  23 [phone]

## 2023-06-06 NOTE — DISCHARGE INSTRUCTIONS
Keep wound clean and dry.  Change bandage daily.  Wash with antibacterial soap and water only.  Denies exposed to dirty water (such as pool water, bath water, lake water, etc.) or harsh chemicals.

## 2023-06-08 LAB — BACTERIA SPEC AEROBE CULT: ABNORMAL

## 2024-01-23 ENCOUNTER — HOSPITAL ENCOUNTER (EMERGENCY)
Facility: HOSPITAL | Age: 39
Discharge: HOME | End: 2024-01-23
Attending: EMERGENCY MEDICINE
Payer: MEDICAID

## 2024-01-23 VITALS
DIASTOLIC BLOOD PRESSURE: 100 MMHG | WEIGHT: 230 LBS | HEART RATE: 104 BPM | RESPIRATION RATE: 20 BRPM | SYSTOLIC BLOOD PRESSURE: 162 MMHG | TEMPERATURE: 98 F | OXYGEN SATURATION: 98 % | BODY MASS INDEX: 33.97 KG/M2

## 2024-01-23 DIAGNOSIS — M10.9 ACUTE GOUTY ARTHRITIS: Primary | ICD-10-CM

## 2024-01-23 PROCEDURE — 99284 EMERGENCY DEPT VISIT MOD MDM: CPT | Mod: ER

## 2024-01-23 PROCEDURE — 96372 THER/PROPH/DIAG INJ SC/IM: CPT | Performed by: PHYSICIAN ASSISTANT

## 2024-01-23 PROCEDURE — 63600175 PHARM REV CODE 636 W HCPCS: Mod: ER | Performed by: PHYSICIAN ASSISTANT

## 2024-01-23 PROCEDURE — 25000003 PHARM REV CODE 250: Mod: ER | Performed by: PHYSICIAN ASSISTANT

## 2024-01-23 RX ORDER — COLCHICINE 0.6 MG/1
1.2 TABLET, FILM COATED ORAL
Status: COMPLETED | OUTPATIENT
Start: 2024-01-23 | End: 2024-01-23

## 2024-01-23 RX ORDER — DEXAMETHASONE SODIUM PHOSPHATE 4 MG/ML
10 INJECTION, SOLUTION INTRA-ARTICULAR; INTRALESIONAL; INTRAMUSCULAR; INTRAVENOUS; SOFT TISSUE
Status: COMPLETED | OUTPATIENT
Start: 2024-01-23 | End: 2024-01-23

## 2024-01-23 RX ORDER — COLCHICINE 0.6 MG/1
0.6 TABLET ORAL DAILY
Qty: 5 TABLET | Refills: 0 | Status: SHIPPED | OUTPATIENT
Start: 2024-01-23 | End: 2024-01-28

## 2024-01-23 RX ADMIN — COLCHICINE 1.2 MG: 0.6 TABLET, FILM COATED ORAL at 09:01

## 2024-01-23 RX ADMIN — DEXAMETHASONE SODIUM PHOSPHATE 10 MG: 4 INJECTION, SOLUTION INTRA-ARTICULAR; INTRALESIONAL; INTRAMUSCULAR; INTRAVENOUS; SOFT TISSUE at 09:01

## 2024-01-23 NOTE — Clinical Note
Vargas accompanied their father to the emergency department on 1/23/2024. They may return to school on 01/24/2024.      If you have any questions or concerns, please don't hesitate to call.       HESHAM

## 2024-01-23 NOTE — Clinical Note
Vargas accompanied their child to the emergency department on 1/23/2024. They may return to school on 01/24/2024.      If you have any questions or concerns, please don't hesitate to call.       CESAR

## 2024-01-23 NOTE — ED PROVIDER NOTES
Encounter Date: 1/23/2024       History     Chief Complaint   Patient presents with    Gout     Pt presented to ER with left knee pain   Pt suffers with gout  +left knee swelling      38-year-old male with past medical history of hypertension and gout presents to the ED today for evaluation of atraumatic left knee pain and swelling onset 2 days ago.  Patient reports symptoms started shortly after eating at a [employer] food restaurant.  Patient states symptoms feel consistent with prior gout flares.  Patient reports he has had gout in his left knee before, and his current symptoms feel the same from that episode.  Has not attempted treatment with any medications so far.  Denies fever, chest pain, shortness of breath, chills, abdominal pain, urinary symptoms.    The history is provided by the patient. No  was used.     Review of patient's allergies indicates:  No Known Allergies  Past Medical History:   Diagnosis Date    Gout     Hypertension      History reviewed. No pertinent surgical history.  History reviewed. No pertinent family history.  Social History     Tobacco Use    Smoking status: Former     Types: Vaping with nicotine    Smokeless tobacco: Never    Tobacco comments:     has not smoked in  8 years.    Substance Use Topics    Alcohol use: Yes     Comment: weekends     Drug use: Yes     Types: Marijuana     Comment: DAILY     Review of Systems   Constitutional:  Negative for chills, fatigue and fever.   HENT:  Negative for congestion, sinus pressure, sneezing and sore throat.    Eyes:  Negative for visual disturbance.   Respiratory:  Negative for cough and shortness of breath.    Cardiovascular:  Negative for chest pain.   Gastrointestinal:  Negative for abdominal pain, nausea and vomiting.   Genitourinary:  Negative for dysuria.   Musculoskeletal:  Positive for arthralgias (L knee) and joint swelling (L knee). Negative for back pain.   Skin:  Negative for color change and rash.    Neurological:  Negative for syncope and weakness.   Hematological:  Does not bruise/bleed easily.       Physical Exam     Initial Vitals [01/23/24 0827]   BP Pulse Resp Temp SpO2   (!) 162/100 104 20 97.8 °F (36.6 °C) 98 %      MAP       --         Physical Exam    Nursing note and vitals reviewed.  Constitutional: He appears well-developed and well-nourished. He is not diaphoretic. No distress.   HENT:   Head: Normocephalic and atraumatic.   Right Ear: External ear normal.   Left Ear: External ear normal.   Cardiovascular:  Normal rate, regular rhythm and intact distal pulses.           Pulmonary/Chest: No respiratory distress.   Abdominal: He exhibits no distension.   Musculoskeletal:      Left upper leg: No swelling, edema, deformity, lacerations, tenderness or bony tenderness.      Left knee: Swelling present. No erythema or ecchymosis. Decreased range of motion. Tenderness (generalized knee) present.      Left lower leg: No swelling, tenderness or bony tenderness. No edema.      Left foot: Normal capillary refill. Normal pulse.     Neurological: He is alert and oriented to person, place, and time. GCS score is 15. GCS eye subscore is 4. GCS verbal subscore is 5. GCS motor subscore is 6.   Skin: Skin is warm and dry.   Psychiatric: He has a normal mood and affect. His behavior is normal. Thought content normal.         ED Course   Procedures  Labs Reviewed - No data to display       Imaging Results    None          Medications   colchicine capsule/tablet 1.2 mg (1.2 mg Oral Given 1/23/24 0914)   dexAMETHasone injection 10 mg (10 mg Intramuscular Given 1/23/24 0914)     Medical Decision Making  38-year-old male with past medical history of hypertension and gout presents to the ED today for evaluation of atraumatic left knee pain and swelling onset 2 days ago.  Patient reports symptoms started shortly after eating at a [employer] food restaurant.  Patient states symptoms feel consistent with prior gout flares.   Patient reports he has had gout in his left knee before, and his current symptoms feel the same from that episode.  Has not attempted treatment with any medications so far.  Denies fever, chest pain, shortness of breath, chills, abdominal pain, urinary symptoms. Exam above.  As there is no significant redness or warmth to his knee and patient was afebrile, I have low suspicion for septic arthritis at this time, however I counseled patient that in the next few days if he starts to obtain a fever, chills, nausea, vomiting or if he starts to notice redness and warmth he was knee then he needs to return to the ED immediately.  Patient voiced understanding and is agreeable with this plan.  Will treat for gout at this time.  Patient received 1st dose of colchicine in the ED along with Decadron.  Colchicine prescriptions sent to pharmacy.  I also advised him to follow up with his primary care doctor for further discussion of gout treatment and prevention.    Of note: Differential diagnosis to include but not limited to: gout, septic arthritis, knee effusion    Henna Awan PA-C    DISCLAIMER: This note was prepared with [employer]` voice recognition transcription software. Garbled syntax, mangled pronouns, and other bizarre constructions may be attributed to that software system. If you have any questions regarding information in this note please contact me.         Risk  Prescription drug management.                                      Clinical Impression:  Final diagnoses:  [M10.9] Acute gouty arthritis (Primary)          ED Disposition Condition    Discharge Stable          ED Prescriptions       Medication Sig Dispense Start Date End Date Auth. Provider    colchicine (COLCRYS) 0.6 mg tablet Take 1 tablet (0.6 mg total) by mouth once daily. for 5 days 5 tablet 1/23/2024 1/28/2024 Henna Awan PA-C          Follow-up Information       Follow up With Specialties Details Why Contact Info    [facility]  Emergency Medicine Go to  As needed, If symptoms worsen [address] [facility] [address]  [phone]             Henna Awan PA-C  01/23/24 4981

## 2024-01-23 NOTE — Clinical Note
Vargas accompanied their father to the emergency department on 1/23/2024. They may return to school on 01/24/2024.      If you have any questions or concerns, please don't hesitate to call.       CESAR

## 2024-01-23 NOTE — DISCHARGE INSTRUCTIONS
Please take the prescribed medications according to the directions on the bottle. If your symptoms worsen please return to the ED for reevaluation. Otherwise, please follow up with your primary care doctor within 1 week.

## 2025-04-11 ENCOUNTER — HOSPITAL ENCOUNTER (EMERGENCY)
Facility: HOSPITAL | Age: 40
Discharge: HOME | End: 2025-04-11
Attending: EMERGENCY MEDICINE

## 2025-04-11 VITALS
TEMPERATURE: 98 F | OXYGEN SATURATION: 97 % | DIASTOLIC BLOOD PRESSURE: 93 MMHG | HEIGHT: 69 IN | HEART RATE: 106 BPM | RESPIRATION RATE: 20 BRPM | WEIGHT: 220 LBS | BODY MASS INDEX: 32.58 KG/M2 | SYSTOLIC BLOOD PRESSURE: 143 MMHG

## 2025-04-11 DIAGNOSIS — M10.9 ACUTE GOUT OF RIGHT FOOT, UNSPECIFIED CAUSE: Primary | ICD-10-CM

## 2025-04-11 PROCEDURE — 25000003 PHARM REV CODE 250: Mod: ER

## 2025-04-11 PROCEDURE — 63600175 PHARM REV CODE 636 W HCPCS: Mod: ER

## 2025-04-11 PROCEDURE — 99284 EMERGENCY DEPT VISIT MOD MDM: CPT | Mod: ER

## 2025-04-11 RX ORDER — COLCHICINE 0.6 MG/1
0.6 TABLET ORAL DAILY
Qty: 5 TABLET | Refills: 0 | Status: SHIPPED | OUTPATIENT
Start: 2025-04-11 | End: 2025-04-16

## 2025-04-11 RX ORDER — PREDNISONE 20 MG/1
40 TABLET ORAL DAILY
Qty: 10 TABLET | Refills: 0 | Status: SHIPPED | OUTPATIENT
Start: 2025-04-11 | End: 2025-04-16

## 2025-04-11 RX ORDER — COLCHICINE 0.6 MG/1
1.2 TABLET, FILM COATED ORAL
Status: COMPLETED | OUTPATIENT
Start: 2025-04-11 | End: 2025-04-11

## 2025-04-11 RX ADMIN — COLCHICINE 1.2 MG: 0.6 TABLET ORAL at 03:04

## 2025-04-11 RX ADMIN — DEXAMETHASONE 10 MG: 2 TABLET ORAL at 03:04

## 2025-04-11 NOTE — DISCHARGE INSTRUCTIONS
You were seen in the emergency department today for gout.  Please take all medications as prescribed and as we discussed.  Follow-up with specialist if instructed to do so.  It is important to remember that some problems are difficult to diagnose and may not be found during your Emergency Department visit. Be sure to follow up with your primary care doctor and review all labs/imaging/tests that were performed during this visit with them. Some labs/tests may be outside of the normal range and require non-emergent follow-up and further investigation to help diagnose/exclude/prevent complications or other medical conditions. Return to the emergency department for any new or worsening symptoms. Thank you for allowing me to care for you today, it was my pleasure. I hope you get to feeling better soon!

## 2025-04-11 NOTE — Clinical Note
"Juan"Maria G Johnson was seen and treated in our emergency department on 4/11/2025.  He may return to work on 04/14/2025.       If you have any questions or concerns, please don't hesitate to call.      Christiano Adam PA-C"

## 2025-04-11 NOTE — ED PROVIDER NOTES
Encounter Date: 4/11/2025    SCRIBE #1 NOTE: I, Afsaneh Aquino, am scribing for, and in the presence of,  Christiano Adam PA-C. I have scribed the following portions of the note - Other sections scribed: HPI,ROS,PE.       History     Chief Complaint   Patient presents with    Foot Injury     Pt states pain to right foot. Pt states he thinks it is his gout acting up     Patient is a 40 y.o. male with a past medical history of gout and hypertension, who presents to the Emergency Department for evaluation of right ankle/foot pain and swelling that began this morning. He reports that he is currently experiencing a gout flare up. He reports that he ran out of his allopurinol so has not been taking this. States feels similar to his gout flares in the past. No trauma.  He denies fever. Denies nausea or vomiting. Denies numbness or tingling.       The history is provided by the patient. No  was used.     Review of patient's allergies indicates:  No Known Allergies  Past Medical History:   Diagnosis Date    Gout     Hypertension      No past surgical history on file.  No family history on file.  Social History[1]  Review of Systems   Constitutional:  Negative for chills and fever.   Gastrointestinal:  Negative for nausea and vomiting.   Musculoskeletal:  Positive for arthralgias and joint swelling. Negative for neck pain and neck stiffness.   Skin:  Negative for color change.   Neurological:  Negative for dizziness, light-headedness, numbness and headaches.       Physical Exam     Initial Vitals [04/11/25 1247]   BP Pulse Resp Temp SpO2   (!) 143/93 106 20 97.9 °F (36.6 °C) 97 %      MAP       --         Physical Exam    Nursing note and vitals reviewed.  Constitutional: He appears well-developed and well-nourished.   HENT:   Head: Normocephalic and atraumatic.   Right Ear: External ear normal.   Left Ear: External ear normal.   Neck: Carotid bruit is not present.   Normal range of  motion.  Cardiovascular:  Normal rate, regular rhythm, normal heart sounds and intact distal pulses.     Exam reveals no gallop and no friction rub.       No murmur heard.  Pulmonary/Chest: Breath sounds normal. No respiratory distress. He has no wheezes. He has no rhonchi. He has no rales.   Abdominal: Abdomen is soft. Bowel sounds are normal. He exhibits no distension. There is no abdominal tenderness. There is no rebound and no guarding.   Musculoskeletal:         General: Normal range of motion.      Cervical back: Normal range of motion.      Comments: Mild swelling to right foot and ankle with warmth. 2+ DP.     Neurological: He is alert and oriented to person, place, and time. GCS score is 15. GCS eye subscore is 4. GCS verbal subscore is 5. GCS motor subscore is 6.   Psychiatric: He has a normal mood and affect.         ED Course   Procedures  Labs Reviewed - No data to display       Imaging Results    None          Medications   dexAMETHasone tablet 10 mg (has no administration in time range)   colchicine capsule/tablet 1.2 mg (has no administration in time range)     Medical Decision Making  This is an emergent evaluation of a 40 y.o. male with a past medical history of gout and hypertension, who presents to the Emergency Department for evaluation of right ankle/foot pain and swelling that began this morning.    Physical exam as above.    Differential diagnosis includes but is not limited to gout, fracture, dislocation, sprain, ligamentous injury.  Considered septic joint but highly doubtful given no joint erythema, swelling, mild range of motion, and no systemic symptoms. Considered but doubt compartment syndrome given soft compartments, 2+ pulses, normal color/temperature of affected extremity, and no pain out of proportion.      Ordered Decadron, colchicine.  Vital signs, chart, labs, and/or imaging were all reviewed.  See ED course below and interpretations above. My overall impression is gout. Will  discharge home with colchicine, prednisone with primary care follow-up. Vital signs are reassuring. Patient/Caregiver is stable for discharge at this time.  Patient/Caregiver was informed of results, plan of care, and are comfortable with this.  All questions and concerns were addressed. Discussed strict return precautions with the patient/caregiver. Instructed follow up with primary care provider within 1 week.      Christiano Adam PA-C    DISCLAIMER: This note was prepared with [employer] voice recognition transcription software. Garbled syntax, mangled pronouns, and other bizarre constructions may be attributed to that software system.      Risk  Prescription drug management.            Scribe Attestation:   Scribe #1: I performed the above scribed service and the documentation accurately describes the services I performed. I attest to the accuracy of the note.        ED Course as of 04/11/25 1505   Fri Apr 11, 2025   1432 BP(!): 143/93 [TM]   1432 Temp: 97.9 °F (36.6 °C) [TM]   1432 Pulse: 106 [TM]   1432 Resp: 20 [TM]   1432 SpO2: 97 % [TM]      ED Course User Index  [TM] Christiano Adam PA-C          I, Christiano Adam PA-C, personally performed the services described in this documentation. All medical record entries made by the scribe were at my direction and in my presence. I have reviewed the chart and agree that the record reflects my personal performance and is accurate and complete.      DISCLAIMER: This note was prepared with [employer] voice recognition transcription software. Garbled syntax, mangled pronouns, and other bizarre constructions may be attributed to that software system.                  Clinical Impression:  Final diagnoses:  [M10.9] Acute gout of right foot, unspecified cause (Primary)          ED Disposition Condition    Discharge Stable          ED Prescriptions       Medication Sig Dispense Start Date End Date Auth. Provider    colchicine (COLCRYS) 0.6 mg tablet Take 1 tablet (0.6 mg total) by  mouth once daily. for 5 days 5 tablet 4/11/2025 4/16/2025 Christiano Adam PA-C    predniSONE (DELTASONE) 20 MG tablet Take 2 tablets (40 mg total) by mouth once daily. for 5 days 10 tablet 4/11/2025 4/16/2025 Christiano Adam PA-C          Follow-up Information       Follow up With Specialties Details Why Contact Info    [facility] ED Emergency Medicine Go to  As needed, If symptoms worsen, or new symptoms develop [address] [facility] [address] [phone]    Primary care doctor  Schedule an appointment as soon as possible for a visit in 3 days                   [1]   Social History  Tobacco Use    Smoking status: Former     Types: Vaping with nicotine    Smokeless tobacco: Never    Tobacco comments:     has not smoked in  8 years.    Substance Use Topics    Alcohol use: Yes     Comment: weekends     Drug use: Yes     Types: Marijuana     Comment: DAILY        Christiano Adam PA-C  04/11/25 6712